# Patient Record
Sex: MALE | Race: OTHER | NOT HISPANIC OR LATINO | Employment: FULL TIME | URBAN - METROPOLITAN AREA
[De-identification: names, ages, dates, MRNs, and addresses within clinical notes are randomized per-mention and may not be internally consistent; named-entity substitution may affect disease eponyms.]

---

## 2021-04-08 DIAGNOSIS — Z23 ENCOUNTER FOR IMMUNIZATION: ICD-10-CM

## 2022-02-13 ENCOUNTER — APPOINTMENT (OUTPATIENT)
Dept: LAB | Facility: HOSPITAL | Age: 67
End: 2022-02-13
Payer: COMMERCIAL

## 2022-02-13 DIAGNOSIS — E78.5 HYPERLIPIDEMIA, UNSPECIFIED HYPERLIPIDEMIA TYPE: ICD-10-CM

## 2022-02-13 DIAGNOSIS — Z12.5 SPECIAL SCREENING FOR MALIGNANT NEOPLASM OF PROSTATE: ICD-10-CM

## 2022-02-13 DIAGNOSIS — I10 ESSENTIAL HYPERTENSION, MALIGNANT: ICD-10-CM

## 2022-02-13 LAB
ALBUMIN SERPL BCP-MCNC: 3.8 G/DL (ref 3.5–5)
ALP SERPL-CCNC: 64 U/L (ref 46–116)
ALT SERPL W P-5'-P-CCNC: 32 U/L (ref 12–78)
ANION GAP SERPL CALCULATED.3IONS-SCNC: 9 MMOL/L (ref 4–13)
AST SERPL W P-5'-P-CCNC: 16 U/L (ref 5–45)
BASOPHILS # BLD AUTO: 0.06 THOUSANDS/ΜL (ref 0–0.1)
BASOPHILS NFR BLD AUTO: 1 % (ref 0–1)
BILIRUB SERPL-MCNC: 0.8 MG/DL (ref 0.2–1)
BUN SERPL-MCNC: 12 MG/DL (ref 5–25)
CALCIUM SERPL-MCNC: 8.6 MG/DL (ref 8.3–10.1)
CHLORIDE SERPL-SCNC: 104 MMOL/L (ref 100–108)
CHOLEST SERPL-MCNC: 203 MG/DL
CO2 SERPL-SCNC: 27 MMOL/L (ref 21–32)
CREAT SERPL-MCNC: 0.97 MG/DL (ref 0.6–1.3)
EOSINOPHIL # BLD AUTO: 0.3 THOUSAND/ΜL (ref 0–0.61)
EOSINOPHIL NFR BLD AUTO: 5 % (ref 0–6)
ERYTHROCYTE [DISTWIDTH] IN BLOOD BY AUTOMATED COUNT: 13.2 % (ref 11.6–15.1)
EST. AVERAGE GLUCOSE BLD GHB EST-MCNC: 137 MG/DL
GFR SERPL CREATININE-BSD FRML MDRD: 81 ML/MIN/1.73SQ M
GLUCOSE P FAST SERPL-MCNC: 123 MG/DL (ref 65–99)
HBA1C MFR BLD: 6.4 %
HCT VFR BLD AUTO: 44.4 % (ref 36.5–49.3)
HDLC SERPL-MCNC: 36 MG/DL
HGB BLD-MCNC: 14.2 G/DL (ref 12–17)
IMM GRANULOCYTES # BLD AUTO: 0.02 THOUSAND/UL (ref 0–0.2)
IMM GRANULOCYTES NFR BLD AUTO: 0 % (ref 0–2)
LDLC SERPL CALC-MCNC: 140 MG/DL (ref 0–100)
LYMPHOCYTES # BLD AUTO: 2.48 THOUSANDS/ΜL (ref 0.6–4.47)
LYMPHOCYTES NFR BLD AUTO: 37 % (ref 14–44)
MCH RBC QN AUTO: 27.1 PG (ref 26.8–34.3)
MCHC RBC AUTO-ENTMCNC: 32 G/DL (ref 31.4–37.4)
MCV RBC AUTO: 85 FL (ref 82–98)
MONOCYTES # BLD AUTO: 0.44 THOUSAND/ΜL (ref 0.17–1.22)
MONOCYTES NFR BLD AUTO: 7 % (ref 4–12)
NEUTROPHILS # BLD AUTO: 3.33 THOUSANDS/ΜL (ref 1.85–7.62)
NEUTS SEG NFR BLD AUTO: 50 % (ref 43–75)
NONHDLC SERPL-MCNC: 167 MG/DL
NRBC BLD AUTO-RTO: 0 /100 WBCS
PLATELET # BLD AUTO: 259 THOUSANDS/UL (ref 149–390)
PMV BLD AUTO: 9.5 FL (ref 8.9–12.7)
POTASSIUM SERPL-SCNC: 4.2 MMOL/L (ref 3.5–5.3)
PROT SERPL-MCNC: 7.7 G/DL (ref 6.4–8.2)
PSA SERPL-MCNC: 2.7 NG/ML (ref 0–4)
RBC # BLD AUTO: 5.24 MILLION/UL (ref 3.88–5.62)
SODIUM SERPL-SCNC: 140 MMOL/L (ref 136–145)
TRIGL SERPL-MCNC: 133 MG/DL
TSH SERPL DL<=0.05 MIU/L-ACNC: 2.76 UIU/ML (ref 0.36–3.74)
URATE SERPL-MCNC: 7 MG/DL (ref 4.2–8)
WBC # BLD AUTO: 6.63 THOUSAND/UL (ref 4.31–10.16)

## 2022-02-13 PROCEDURE — 80061 LIPID PANEL: CPT

## 2022-02-13 PROCEDURE — G0103 PSA SCREENING: HCPCS

## 2022-02-13 PROCEDURE — 83036 HEMOGLOBIN GLYCOSYLATED A1C: CPT

## 2022-02-13 PROCEDURE — 85025 COMPLETE CBC W/AUTO DIFF WBC: CPT

## 2022-02-13 PROCEDURE — 84443 ASSAY THYROID STIM HORMONE: CPT

## 2022-02-13 PROCEDURE — 36415 COLL VENOUS BLD VENIPUNCTURE: CPT

## 2022-02-13 PROCEDURE — 84550 ASSAY OF BLOOD/URIC ACID: CPT

## 2022-02-13 PROCEDURE — 80053 COMPREHEN METABOLIC PANEL: CPT

## 2024-03-26 ENCOUNTER — APPOINTMENT (OUTPATIENT)
Dept: LAB | Facility: CLINIC | Age: 69
End: 2024-03-26
Payer: COMMERCIAL

## 2024-03-26 ENCOUNTER — TRANSCRIBE ORDERS (OUTPATIENT)
Dept: LAB | Facility: CLINIC | Age: 69
End: 2024-03-26

## 2024-03-26 DIAGNOSIS — Z00.00 ROUTINE GENERAL MEDICAL EXAMINATION AT A HEALTH CARE FACILITY: Primary | ICD-10-CM

## 2024-03-26 DIAGNOSIS — R73.09 IMPAIRED GLUCOSE TOLERANCE TEST: ICD-10-CM

## 2024-03-26 DIAGNOSIS — E53.9 VITAMIN B-COMPLEX DEFICIENCY: ICD-10-CM

## 2024-03-26 DIAGNOSIS — E78.9 DISORDER OF LIPOPROTEIN AND LIPID METABOLISM: ICD-10-CM

## 2024-03-26 DIAGNOSIS — Z12.5 SPECIAL SCREENING FOR MALIGNANT NEOPLASM OF PROSTATE: ICD-10-CM

## 2024-03-26 DIAGNOSIS — R94.6 NONSPECIFIC ABNORMAL RESULTS OF THYROID FUNCTION STUDY: ICD-10-CM

## 2024-03-26 DIAGNOSIS — Z13.9 SCREENING FOR UNSPECIFIED CONDITION: ICD-10-CM

## 2024-03-26 DIAGNOSIS — R79.89 HYPOURICEMIA: ICD-10-CM

## 2024-03-26 DIAGNOSIS — E55.9 AVITAMINOSIS D: ICD-10-CM

## 2024-03-26 DIAGNOSIS — R82.90 NONSPECIFIC FINDING ON EXAMINATION OF URINE: ICD-10-CM

## 2024-03-26 DIAGNOSIS — Z00.00 ROUTINE GENERAL MEDICAL EXAMINATION AT A HEALTH CARE FACILITY: ICD-10-CM

## 2024-03-26 LAB
25(OH)D3 SERPL-MCNC: 21.1 NG/ML (ref 30–100)
ALBUMIN SERPL BCP-MCNC: 4.4 G/DL (ref 3.5–5)
ALP SERPL-CCNC: 64 U/L (ref 34–104)
ALT SERPL W P-5'-P-CCNC: 31 U/L (ref 7–52)
ANION GAP SERPL CALCULATED.3IONS-SCNC: 11 MMOL/L (ref 4–13)
AST SERPL W P-5'-P-CCNC: 22 U/L (ref 13–39)
BACTERIA UR QL AUTO: ABNORMAL /HPF
BILIRUB SERPL-MCNC: 0.99 MG/DL (ref 0.2–1)
BILIRUB UR QL STRIP: NEGATIVE
BUN SERPL-MCNC: 12 MG/DL (ref 5–25)
CALCIUM SERPL-MCNC: 9.4 MG/DL (ref 8.4–10.2)
CHLORIDE SERPL-SCNC: 104 MMOL/L (ref 96–108)
CHOLEST SERPL-MCNC: 150 MG/DL
CLARITY UR: CLEAR
CO2 SERPL-SCNC: 25 MMOL/L (ref 21–32)
COLOR UR: YELLOW
CREAT SERPL-MCNC: 0.82 MG/DL (ref 0.6–1.3)
ERYTHROCYTE [DISTWIDTH] IN BLOOD BY AUTOMATED COUNT: 14.2 % (ref 11.6–15.1)
EST. AVERAGE GLUCOSE BLD GHB EST-MCNC: 148 MG/DL
FERRITIN SERPL-MCNC: 40 NG/ML (ref 24–336)
GFR SERPL CREATININE-BSD FRML MDRD: 90 ML/MIN/1.73SQ M
GLUCOSE P FAST SERPL-MCNC: 130 MG/DL (ref 65–99)
GLUCOSE UR STRIP-MCNC: NEGATIVE MG/DL
HBA1C MFR BLD: 6.8 %
HCT VFR BLD AUTO: 47.1 % (ref 36.5–49.3)
HDLC SERPL-MCNC: 42 MG/DL
HGB BLD-MCNC: 14.8 G/DL (ref 12–17)
HGB UR QL STRIP.AUTO: NEGATIVE
HYALINE CASTS #/AREA URNS LPF: ABNORMAL /LPF
KETONES UR STRIP-MCNC: NEGATIVE MG/DL
LDLC SERPL CALC-MCNC: 82 MG/DL (ref 0–100)
LEUKOCYTE ESTERASE UR QL STRIP: NEGATIVE
MCH RBC QN AUTO: 27.4 PG (ref 26.8–34.3)
MCHC RBC AUTO-ENTMCNC: 31.4 G/DL (ref 31.4–37.4)
MCV RBC AUTO: 87 FL (ref 82–98)
MUCOUS THREADS UR QL AUTO: ABNORMAL
NITRITE UR QL STRIP: NEGATIVE
NON-SQ EPI CELLS URNS QL MICRO: ABNORMAL /HPF
NONHDLC SERPL-MCNC: 108 MG/DL
PH UR STRIP.AUTO: 6 [PH]
PLATELET # BLD AUTO: 284 THOUSANDS/UL (ref 149–390)
PMV BLD AUTO: 10.6 FL (ref 8.9–12.7)
POTASSIUM SERPL-SCNC: 4.1 MMOL/L (ref 3.5–5.3)
PROT SERPL-MCNC: 7.3 G/DL (ref 6.4–8.4)
PROT UR STRIP-MCNC: ABNORMAL MG/DL
PSA SERPL-MCNC: 4.14 NG/ML (ref 0–4)
RBC # BLD AUTO: 5.41 MILLION/UL (ref 3.88–5.62)
RBC #/AREA URNS AUTO: ABNORMAL /HPF
SODIUM SERPL-SCNC: 140 MMOL/L (ref 135–147)
SP GR UR STRIP.AUTO: 1.02 (ref 1–1.03)
TRIGL SERPL-MCNC: 130 MG/DL
TSH SERPL DL<=0.05 MIU/L-ACNC: 3.55 UIU/ML (ref 0.45–4.5)
UROBILINOGEN UR STRIP-ACNC: <2 MG/DL
VIT B12 SERPL-MCNC: 322 PG/ML (ref 180–914)
WBC # BLD AUTO: 7.31 THOUSAND/UL (ref 4.31–10.16)
WBC #/AREA URNS AUTO: ABNORMAL /HPF

## 2024-03-26 PROCEDURE — 36415 COLL VENOUS BLD VENIPUNCTURE: CPT

## 2024-03-26 PROCEDURE — 83036 HEMOGLOBIN GLYCOSYLATED A1C: CPT

## 2024-03-26 PROCEDURE — 84443 ASSAY THYROID STIM HORMONE: CPT

## 2024-03-26 PROCEDURE — G0103 PSA SCREENING: HCPCS

## 2024-03-26 PROCEDURE — 80061 LIPID PANEL: CPT

## 2024-03-26 PROCEDURE — 82607 VITAMIN B-12: CPT

## 2024-03-26 PROCEDURE — 85027 COMPLETE CBC AUTOMATED: CPT

## 2024-03-26 PROCEDURE — 82306 VITAMIN D 25 HYDROXY: CPT

## 2024-03-26 PROCEDURE — 80053 COMPREHEN METABOLIC PANEL: CPT

## 2024-03-26 PROCEDURE — 82728 ASSAY OF FERRITIN: CPT

## 2024-03-26 PROCEDURE — 81001 URINALYSIS AUTO W/SCOPE: CPT

## 2024-07-11 ENCOUNTER — OFFICE VISIT (OUTPATIENT)
Dept: UROLOGY | Facility: CLINIC | Age: 69
End: 2024-07-11
Payer: COMMERCIAL

## 2024-07-11 VITALS
DIASTOLIC BLOOD PRESSURE: 80 MMHG | OXYGEN SATURATION: 98 % | SYSTOLIC BLOOD PRESSURE: 140 MMHG | HEIGHT: 71 IN | HEART RATE: 75 BPM | BODY MASS INDEX: 26.6 KG/M2 | WEIGHT: 190 LBS

## 2024-07-11 DIAGNOSIS — R97.20 ELEVATED PSA: Primary | ICD-10-CM

## 2024-07-11 DIAGNOSIS — N40.1 BENIGN PROSTATIC HYPERPLASIA WITH URINARY FREQUENCY: ICD-10-CM

## 2024-07-11 DIAGNOSIS — R35.0 BENIGN PROSTATIC HYPERPLASIA WITH URINARY FREQUENCY: ICD-10-CM

## 2024-07-11 PROCEDURE — 99203 OFFICE O/P NEW LOW 30 MIN: CPT | Performed by: PHYSICIAN ASSISTANT

## 2024-07-11 RX ORDER — ATENOLOL 50 MG/1
50 TABLET ORAL DAILY
COMMUNITY
Start: 2024-04-15

## 2024-07-11 RX ORDER — AMLODIPINE BESYLATE 5 MG/1
5 TABLET ORAL DAILY
COMMUNITY
Start: 2024-04-15

## 2024-07-11 RX ORDER — TAMSULOSIN HYDROCHLORIDE 0.4 MG/1
0.4 CAPSULE ORAL
Qty: 90 CAPSULE | Refills: 1 | Status: SHIPPED | OUTPATIENT
Start: 2024-07-11

## 2024-07-11 RX ORDER — ERGOCALCIFEROL 1.25 MG/1
50000 CAPSULE ORAL WEEKLY
COMMUNITY
Start: 2024-04-08

## 2024-07-11 NOTE — PROGRESS NOTES
7/11/2024      Chief Complaint   Patient presents with   • Urinary Frequency         Assessment and Plan    68 y.o. male managed by NEW PATIENT    1. Elevated PSA  Assessment & Plan:  GOSIA today smooth 50 g prostate flat no nodule or asymmetry  Recheck PSA in 2-3 weeks, avoid ejaculation cycling rowing or anything in the rectum in the 72 hours prior    If there is persistent or progressive PSA rise we will utilize multiparametric MRI for further restratification for prostate cancer, and fusion biopsies in network.  Orders:  -     PSA Total, Diagnostic; Future; Expected date: 08/01/2024  2. Benign prostatic hyperplasia with urinary frequency  Assessment & Plan:  6-12 months of lower urinary tract bother-discussed options, dietary modifications adjusting timing of his antihypertensives to the morning, ensuring adequate hydration while avoiding excessive coffee tea caffeine.  He wishes to trial tamsulosin 0.4 mg nightly over the next 2-3 months with a symptom reassessment at that time  Orders:  -     tamsulosin (FLOMAX) 0.4 mg; Take 1 capsule (0.4 mg total) by mouth daily at bedtime        History of Present Illness  Ted Gale is a 68 y.o. male here for evaluation of new patient complains of urinary frequency daytime and nighttime worse in the daytime sometimes every 30 minutes other x 1-2 hours.  He wakes at least 3 times overnight to urinate.  He reports good flow but not sure that he ever really holds enough urine to get a good stream, no dysuria no hematuria no flank pain and no bladder pain.  He has not ever had infections catheters or surgeries to his urinary tract.  Not tried any dietary/behavioral modifications or prescription therapy for his symptoms.  He takes few low-dose hypertension and diabetes medications.  These are also new within the past year or 2.  Screening PSA just above normal threshold 4.1 earlier this year.  Has not had prior prostate exams.  No personal or family history of   "cancers.    patient's daughter provides additional history along with translation assistance when needed    [facility]rology 2021 for phimosis, lotrisone rx with relief    Review of Systems   Constitutional: Negative.    Respiratory: Negative.     Cardiovascular: Negative.    Genitourinary:  Positive for frequency. Negative for decreased urine volume, difficulty urinating, dysuria, flank pain, hematuria, testicular pain and urgency.   Musculoskeletal: Negative.                 Vitals  Vitals:    07/11/24 0954   BP: 140/80   BP Location: Left arm   Patient Position: Sitting   Cuff Size: Adult   Pulse: 75   SpO2: 98%   Weight: 86.2 kg (190 lb)   Height: 5' 11\" (1.803 m)       Physical Exam  Vitals and nursing note reviewed.   Constitutional:       General: He is not in acute distress.     Appearance: Normal appearance. He is well-developed. He is not diaphoretic.   HENT:      Head: Normocephalic and atraumatic.   Pulmonary:      Effort: Pulmonary effort is normal.      Comments: No cough or audible wheeze  Abdominal:      General: There is no distension.      Tenderness: There is no abdominal tenderness. There is no right CVA tenderness or left CVA tenderness.   Genitourinary:     Comments: uncircumcised penis, normal phallus, orthotopic patent meatus slight discoloration to the glans he has used lotrisone on and off few years for phimosis there is no active balanitis and foreskin easily retracts and reduces.  Testes smooth descended bilaterally into the scrotum nontender with no palpable mass.  Digital rectal exam smooth prostate, without appreciable nodule, induration or asymmetry  Musculoskeletal:      Right lower leg: No edema.      Left lower leg: No edema.   Skin:     General: Skin is warm and dry.   Neurological:      Mental Status: He is alert and oriented to person, place, and time.      Gait: Gait normal.   Psychiatric:         Speech: Speech normal.         Behavior: Behavior normal. " "          Past History  Past Medical History:   Diagnosis Date   • Diabetes mellitus (HCC)      Social History     Socioeconomic History   • Marital status: /Civil Union     Spouse name: None   • Number of children: None   • Years of education: None   • Highest education level: None   Occupational History   • None   Tobacco Use   • Smoking status: Never   • Smokeless tobacco: Never   Substance and Sexual Activity   • Alcohol use: Never   • Drug use: Never   • Sexual activity: None   Other Topics Concern   • None   Social History Narrative   • None     Social Determinants of Health     Financial Resource Strain: Not on file   Food Insecurity: Not on file   Transportation Needs: Not on file   Physical Activity: Not on file   Stress: Not on file   Social Connections: Not on file   Intimate Partner Violence: Not on file   Housing Stability: Not on file     Social History     Tobacco Use   Smoking Status Never   Smokeless Tobacco Never     History reviewed. No pertinent family history.    The following portions of the patient's history were reviewed and updated as appropriate: allergies, current medications, past medical history, past social history, past surgical history and problem list.    Results  No results found for this or any previous visit (from the past 1 hour(s)).]  Lab Results   Component Value Date    PSA 4.14 (H) 03/26/2024    PSA 2.7 02/13/2022     Lab Results   Component Value Date    CALCIUM 9.4 03/26/2024    K 4.1 03/26/2024    CO2 25 03/26/2024     03/26/2024    BUN 12 03/26/2024    CREATININE 0.82 03/26/2024     Lab Results   Component Value Date    WBC 7.31 03/26/2024    HGB 14.8 03/26/2024    HCT 47.1 03/26/2024    MCV 87 03/26/2024     03/26/2024       Portions of the above record have been created with voice recognition software via dictation. Occasional wrong word or \"sound alike\" substitution may have occurred due to the inherent limitations of voice recognition software. " Read the chart carefully and recognize, using context, where substitution may have occurred.

## 2024-07-11 NOTE — ASSESSMENT & PLAN NOTE
6-12 months of lower urinary tract bother-discussed options, dietary modifications adjusting timing of his antihypertensives to the morning, ensuring adequate hydration while avoiding excessive coffee tea caffeine.  He wishes to trial tamsulosin 0.4 mg nightly over the next 2-3 months with a symptom reassessment at that time

## 2024-07-11 NOTE — ASSESSMENT & PLAN NOTE
GOSIA today smooth 50 g prostate flat no nodule or asymmetry  Recheck PSA in 2-3 weeks, avoid ejaculation cycling rowing or anything in the rectum in the 72 hours prior    If there is persistent or progressive PSA rise we will utilize multiparametric MRI for further restratification for prostate cancer, and fusion biopsies in network.

## 2024-08-05 ENCOUNTER — TELEPHONE (OUTPATIENT)
Dept: OTHER | Facility: HOSPITAL | Age: 69
End: 2024-08-05

## 2024-08-05 ENCOUNTER — APPOINTMENT (OUTPATIENT)
Dept: LAB | Facility: CLINIC | Age: 69
End: 2024-08-05
Payer: COMMERCIAL

## 2024-08-05 DIAGNOSIS — R97.20 ELEVATED PSA: ICD-10-CM

## 2024-08-05 DIAGNOSIS — R97.20 ELEVATED PSA: Primary | ICD-10-CM

## 2024-08-05 LAB — PSA SERPL-MCNC: 4.28 NG/ML (ref 0–4)

## 2024-08-05 PROCEDURE — 36415 COLL VENOUS BLD VENIPUNCTURE: CPT

## 2024-08-05 PROCEDURE — 84153 ASSAY OF PSA TOTAL: CPT

## 2024-08-05 NOTE — TELEPHONE ENCOUNTER
[facility] pt seen few weeks ago. discussed elevated psa and future mri. please let pt and his daughter (she translates) know the psa is still elevated i recommend doing the MRI. will watch for results

## 2024-08-06 DIAGNOSIS — R97.20 ELEVATED PSA: Primary | ICD-10-CM

## 2024-08-06 NOTE — TELEPHONE ENCOUNTER
Contacted patient preferred phone number listed in chart and spoke with his daughter in law Shona who is on communication consent. Advised her of patients elevated PSA result and plan for mpMRI prostate. She was provided with central scheduling phone number and will call to schedule imaging for patient. Also aware patient is to have BMP completed prior to MRI. Office to call with results.

## 2024-08-17 ENCOUNTER — APPOINTMENT (OUTPATIENT)
Dept: LAB | Facility: HOSPITAL | Age: 69
End: 2024-08-17
Payer: COMMERCIAL

## 2024-08-17 LAB
ANION GAP SERPL CALCULATED.3IONS-SCNC: 6 MMOL/L (ref 4–13)
BUN SERPL-MCNC: 13 MG/DL (ref 5–25)
CALCIUM SERPL-MCNC: 9.4 MG/DL (ref 8.4–10.2)
CHLORIDE SERPL-SCNC: 105 MMOL/L (ref 96–108)
CO2 SERPL-SCNC: 27 MMOL/L (ref 21–32)
CREAT SERPL-MCNC: 0.77 MG/DL (ref 0.6–1.3)
GFR SERPL CREATININE-BSD FRML MDRD: 93 ML/MIN/1.73SQ M
GLUCOSE SERPL-MCNC: 113 MG/DL (ref 65–140)
POTASSIUM SERPL-SCNC: 4.4 MMOL/L (ref 3.5–5.3)
SODIUM SERPL-SCNC: 138 MMOL/L (ref 135–147)

## 2024-08-17 PROCEDURE — 80048 BASIC METABOLIC PNL TOTAL CA: CPT

## 2024-08-17 PROCEDURE — 36415 COLL VENOUS BLD VENIPUNCTURE: CPT

## 2024-08-19 ENCOUNTER — HOSPITAL ENCOUNTER (OUTPATIENT)
Facility: MEDICAL CENTER | Age: 69
Discharge: HOME | End: 2024-08-19
Payer: COMMERCIAL

## 2024-08-19 DIAGNOSIS — R97.20 ELEVATED PSA: ICD-10-CM

## 2024-08-19 PROCEDURE — 72197 MRI PELVIS W/O & W/DYE: CPT

## 2024-08-19 PROCEDURE — A9585 GADOBUTROL INJECTION: HCPCS | Performed by: PHYSICIAN ASSISTANT

## 2024-08-19 PROCEDURE — 76377 3D RENDER W/INTRP POSTPROCES: CPT

## 2024-08-19 RX ORDER — GADOBUTROL 604.72 MG/ML
8 INJECTION INTRAVENOUS
Status: COMPLETED | OUTPATIENT
Start: 2024-08-19 | End: 2024-08-19

## 2024-08-19 RX ADMIN — GADOBUTROL 8 ML: 604.72 INJECTION INTRAVENOUS at 11:36

## 2024-10-14 NOTE — DISCHARGE INSTRUCTIONS
"Vigorous oral fluid intake and limited activity for the next 24 to 48 hours.  Report any excessive bleeding difficulty urinating or fevers    Patient Education     Prostate biopsy   The Basics   Written by the doctors and editors at [facility]   What is a prostate biopsy? -- A prostate biopsy is a procedure to check the prostate for cancer or other problems. The prostate is a gland that surrounds the urethra (the tube that carries urine from the bladder out through the penis) (figure 1). For a biopsy, a doctor takes a small sample of tissue from the prostate.  You might get a prostate biopsy if:   A blood test shows that your PSA level is high - PSA stands for \"prostate-specific antigen.\" It is a protein made by the prostate. PSA levels usually go up when a person has prostate cancer. But they can also go up for reasons that do not involve cancer. A prostate biopsy can help your doctor figure out the cause of a high PSA.   Your doctor finds a problem during a \"digital rectal exam\" - For a digital rectal exam, your doctor puts a gloved finger into your rectum to feel your prostate. If they feel a lump or thickened area during this exam, they might do a prostate biopsy to help figure out the cause.  How do I prepare for prostate biopsy? -- Before your procedure, your doctor will do an exam and ask you about your \"health history.\" This involves asking you questions about any health problems you have or had in the past, past surgeries or biopsies, and any medicines you take. Tell them about:   Any medicines you are taking - This includes any prescription or \"over-the-counter\" medicines you use, plus any herbal supplements you take. It helps to write down and bring a list of any medicines you take, or bring a bag with all of your medicines with you.   Any allergies you have   Any bleeding problems you have - Certain medicines, including some herbs and supplements, can increase the risk of bleeding. Some health conditions " "also increase this risk. You might need to stop certain medicines for a time before your biopsy.  The doctor or nurse will tell you if you need to do anything special to prepare. In some cases, the doctor might prescribe a medicine to help you relax during the procedure.  Before the procedure, you might need to:   Use an enema or suppository to clean out your rectum.   Get a urine test to check for bacteria.   Take an antibiotic.  You will also get information about:   Eating and drinking before your procedure - In some cases, you might need to \"fast\" before the procedure. This means not eating or drinking anything for a period of time. In other cases, you might be allowed to have liquids until a short time before the procedure. Whether you need to fast, and for how long, depends on the procedure you are having.  Ask the doctor or nurse if you have questions or if there is anything you do not understand.  What happens during prostate biopsy? -- This depends on:   If you have had a prostate biopsy before   If the biopsy is done in a doctor's office or in the hospital   The type of biopsy   How many tissue samples the doctor takes  When it is time for the procedure:   You might get an \"IV,\" which is a thin tube that goes into a vein. This can be used to give you fluids and medicines.   You will get anesthesia medicines. This is to make sure that you do not feel pain during the procedure. Types of anesthesia include:   Local - This type of anesthesia uses medicine to numb a small part of your body so you don't feel pain.   Sedatives - These are medicines to make you relax and feel sleepy.   The doctors and nurses will have you lie on your side with your knees and hips bent for the procedure. They will monitor your breathing, blood pressure, and heart rate during the procedure.   The doctor might use an imaging test such as an ultrasound, MRI, or CT scan to take pictures of your prostate. The pictures will guide the " doctor as they do the biopsy. Sometimes, the imaging test is done before the procedure. Other times, the doctor does the imaging test during the procedure.   The doctor will take a small amount of tissue from the prostate. This can be done in 1 of 2 ways:   Transrectal biopsy - The doctor inserts a small ultrasound probe into the rectum. A needle goes through the probe and removes small pieces of tissue.   Transperineal biopsy - The doctor inserts the needle through the skin between the anus and the scrotum to take the tissue samples.   The procedure takes about 15 minutes.  What happens after prostate biopsy? -- After the procedure, the staff will watch you closely as your anesthesia wears off. Most of the time, you can go home a short time after your biopsy.  As you recover:   It is common to have a small amount of bright red blood from your rectum. You might also have a small amount of blood in your urine or semen.   You will get medicine to help with pain, if needed. You can take non-prescription medicines to relieve pain, such as acetaminophen (sample brand name: Tylenol). The doctor might recommend that you avoid ibuprofen (sample brand names: Advil, Motrin) and naproxen (sample brand name: Aleve) because they can increase your risk of bleeding.   Apply a cold gel pack, bag of ice, or bag of frozen vegetables to your perineum every 1 to 2 hours, for 15 minutes each time. Put a thin towel between the ice (or other cold object) and your skin. Some people find it helpful to use ice for the first 2 days after their biopsy.  You can go back to your normal activities after the procedure, including having sex. Some people are more comfortable if they avoid activities like riding a bike, motorcycle, or horse for about a week.  Your doctor or nurse will tell you when to expect your results.  What are the risks of prostate biopsy? -- Your doctor will talk to you about all of the possible risks, and answer your  questions. Possible risks include:   Infection   Bleeding from your rectum, or blood in your urine or semen   Very bad pain   A tear in the wall of the bladder or rectum   Trouble urinating   Trouble getting an erection  When should I call the doctor? -- Call for advice if:   You have a fever of 100.4°F (38°C) or higher, or chills.   You are not able to urinate, and your bladder feels full.   You have pain or burning when you urinate that lasts for more than a few days.   You have large blood clots (the size of a quarter or bigger) in your urine or start seeing more blood in your urine.   Your urine is thick or cloudy, or has a bad smell.   You have more bleeding from your rectum.   You have very bad pain in your belly that is not helped by pain relievers.  All topics are updated as new evidence becomes available and our peer review process is complete.  This topic retrieved from [employer] on: Mar 14, 2024.  Topic 857320 Version 1.0  Release: 32.2.4 - C32.72  © 2024 UpToDate, Inc. and/or its affiliates. All rights reserved.  figure 1: Prostate gland     This drawing shows male internal organs and a close-up of the prostate gland.  Graphic 41070 Version 5.0  Consumer Information Use and Disclaimer   Disclaimer: This generalized information is a limited summary of diagnosis, treatment, and/or medication information. It is not meant to be comprehensive and should be used as a tool to help the user understand and/or assess potential diagnostic and treatment options. It does NOT include all information about conditions, treatments, medications, side effects, or risks that may apply to a specific patient. It is not intended to be medical advice or a substitute for the medical advice, diagnosis, or treatment of a health care provider based on the health care provider's examination and assessment of a patient's specific and unique circumstances. Patients must speak with a health care provider for complete information about  their health, medical questions, and treatment options, including any risks or benefits regarding use of medications. This information does not endorse any treatments or medications as safe, effective, or approved for treating a specific patient. UpToDate, Inc. and its affiliates disclaim any warranty or liability relating to this information or the use thereof.The use of this information is governed by the Terms of Use, available at https://www.woltersFairShareuwer.com/en/know/clinical-effectiveness-terms. 2024© UpToDate, Inc. and its affiliates and/or licensors. All rights reserved.  Copyright   © 2024 UpToDate, Inc. and/or its affiliates. All rights reserved.

## 2024-10-14 NOTE — H&P
H&P Exam - Urology   Ted Gale 68 y.o. male MRN: [id number]  Unit/Bed#:  Encounter: [id number]    Assessment & Plan     Assessment:  Elevated PSA 4.282  PI-RADS 4 lesion of the prostate on multiparametric MRI    Plan:  MR fusion guided transrectal ultrasound-guided transperineal needle biopsies of the prostate    History of Present Illness   HPI:  Ted Gale is a 68 y.o. male who presents with an elevated PSA of 4.12.  The patient underwent a parametric MRI of the prostate which revealed a 0.9 x 0.6 x 0.9 lesion of the prostate in the left mid lateral peripheral zone, PI-RADS 4.  The patient now presents for MR fusion guided transrectal ultrasound-guided transperineal needle biopsies of the prostate.  I met the patient in the holding area discussed the procedure step-by-step with him performed history and physical answered all patient questions and discussed potential risks and complications of bleeding infection false negative biopsies potential retention potential need for Kendrick catheter or operative intervention.  The patient expressed understanding and provided verbal and signed informed consent.    Review of Systems    Historical Information   Past Medical History:   Diagnosis Date    Diabetes mellitus (HCC)      No past surgical history on file.  Social History   Social History     Substance and Sexual Activity   Alcohol Use Never     Social History     Substance and Sexual Activity   Drug Use Never     Social History     Tobacco Use   Smoking Status Never   Smokeless Tobacco Never     Family History: No family history on file.    Meds/Allergies   all medications and allergies reviewed  No Known Allergies    Objective   Vitals: There were no vitals taken for this visit.    No intake/output data recorded.    Invasive Devices       None                   Physical Exam  Vitals reviewed.   Constitutional:       General: He is not in acute distress.     Appearance: Normal appearance. He is normal weight.  He is not ill-appearing, toxic-appearing or diaphoretic.   HENT:      Head: Normocephalic and atraumatic.      Nose: Nose normal.      Mouth/Throat:      Mouth: Mucous membranes are moist.   Eyes:      Extraocular Movements: Extraocular movements intact.   Cardiovascular:      Rate and Rhythm: Normal rate and regular rhythm.   Pulmonary:      Effort: Pulmonary effort is normal. No respiratory distress.      Breath sounds: Normal breath sounds. No wheezing, rhonchi or rales.   Abdominal:      General: There is no distension.      Palpations: Abdomen is soft.      Tenderness: There is no abdominal tenderness. There is no guarding or rebound.   Musculoskeletal:         General: Normal range of motion.      Cervical back: Neck supple.   Skin:     General: Skin is dry.   Neurological:      Mental Status: He is alert and oriented to person, place, and time.   Psychiatric:         Mood and Affect: Mood normal.         Behavior: Behavior normal.         Thought Content: Thought content normal.         Judgment: Judgment normal.         Lab Results: I have personally reviewed pertinent reports.    Imaging: Results Review Statement: I personally reviewed the following image studies in PACS and associated radiology reports: MRI abdomen/MRCP. My interpretation of the radiology images/reports is: Agree with multiparametric MRI.  EKG, Pathology, and Other Studies: Agree with the parametric MRI  VTE Prophylaxis: Sequential compression device (Venodyne)     Code Status: No Order  Advance Directive and Living Will:      Power of :    POLST:      Counseling / Coordination of Care  Total floor / unit time spent today 30 minutes.  Greater than 50% of total time was spent with the patient and / or family counseling and / or coordination of care.  A description of the counseling / coordination of care:  .

## 2024-10-15 ENCOUNTER — OFFICE VISIT (OUTPATIENT)
Dept: UROLOGY | Facility: CLINIC | Age: 69
End: 2024-10-15
Payer: COMMERCIAL

## 2024-10-15 VITALS
SYSTOLIC BLOOD PRESSURE: 146 MMHG | HEIGHT: 71 IN | OXYGEN SATURATION: 98 % | HEART RATE: 69 BPM | DIASTOLIC BLOOD PRESSURE: 86 MMHG | BODY MASS INDEX: 26.18 KG/M2 | WEIGHT: 187 LBS

## 2024-10-15 DIAGNOSIS — R35.0 BENIGN PROSTATIC HYPERPLASIA WITH URINARY FREQUENCY: ICD-10-CM

## 2024-10-15 DIAGNOSIS — R97.20 ELEVATED PSA: Primary | ICD-10-CM

## 2024-10-15 DIAGNOSIS — N40.1 BENIGN PROSTATIC HYPERPLASIA WITH URINARY FREQUENCY: ICD-10-CM

## 2024-10-15 PROCEDURE — 99213 OFFICE O/P EST LOW 20 MIN: CPT | Performed by: PHYSICIAN ASSISTANT

## 2024-10-28 ENCOUNTER — APPOINTMENT (OUTPATIENT)
Dept: LAB | Facility: HOSPITAL | Age: 69
End: 2024-10-28
Payer: COMMERCIAL

## 2024-10-28 DIAGNOSIS — E11.9 TYPE 2 DIABETES MELLITUS WITHOUT COMPLICATION, WITHOUT LONG-TERM CURRENT USE OF INSULIN (HCC): ICD-10-CM

## 2024-10-28 DIAGNOSIS — Z79.84 DIABETES MELLITUS TREATED WITH ORAL MEDICATION (HCC): ICD-10-CM

## 2024-10-28 DIAGNOSIS — R97.20 ELEVATED PROSTATE SPECIFIC ANTIGEN (PSA): ICD-10-CM

## 2024-10-28 DIAGNOSIS — E11.9 DIABETES MELLITUS TREATED WITH ORAL MEDICATION (HCC): ICD-10-CM

## 2024-10-28 LAB
ALBUMIN SERPL BCG-MCNC: 4.6 G/DL (ref 3.5–5)
ALP SERPL-CCNC: 65 U/L (ref 34–104)
ALT SERPL W P-5'-P-CCNC: 17 U/L (ref 7–52)
ANION GAP SERPL CALCULATED.3IONS-SCNC: 7 MMOL/L (ref 4–13)
AST SERPL W P-5'-P-CCNC: 17 U/L (ref 13–39)
ATRIAL RATE: 62 BPM
BASOPHILS # BLD AUTO: 0.1 THOUSANDS/ΜL (ref 0–0.1)
BASOPHILS NFR BLD AUTO: 2 % (ref 0–1)
BILIRUB SERPL-MCNC: 1.38 MG/DL (ref 0.2–1)
BUN SERPL-MCNC: 12 MG/DL (ref 5–25)
CALCIUM SERPL-MCNC: 9.7 MG/DL (ref 8.4–10.2)
CHLORIDE SERPL-SCNC: 102 MMOL/L (ref 96–108)
CO2 SERPL-SCNC: 29 MMOL/L (ref 21–32)
CREAT SERPL-MCNC: 0.76 MG/DL (ref 0.6–1.3)
EOSINOPHIL # BLD AUTO: 0.2 THOUSAND/ΜL (ref 0–0.61)
EOSINOPHIL NFR BLD AUTO: 3 % (ref 0–6)
ERYTHROCYTE [DISTWIDTH] IN BLOOD BY AUTOMATED COUNT: 13.7 % (ref 11.6–15.1)
EST. AVERAGE GLUCOSE BLD GHB EST-MCNC: 134 MG/DL
GFR SERPL CREATININE-BSD FRML MDRD: 93 ML/MIN/1.73SQ M
GLUCOSE P FAST SERPL-MCNC: 138 MG/DL (ref 65–99)
HBA1C MFR BLD: 6.3 %
HCT VFR BLD AUTO: 46.9 % (ref 36.5–49.3)
HGB BLD-MCNC: 15.1 G/DL (ref 12–17)
IMM GRANULOCYTES # BLD AUTO: 0.03 THOUSAND/UL (ref 0–0.2)
IMM GRANULOCYTES NFR BLD AUTO: 1 % (ref 0–2)
LYMPHOCYTES # BLD AUTO: 2.51 THOUSANDS/ΜL (ref 0.6–4.47)
LYMPHOCYTES NFR BLD AUTO: 38 % (ref 14–44)
MCH RBC QN AUTO: 27.5 PG (ref 26.8–34.3)
MCHC RBC AUTO-ENTMCNC: 32.2 G/DL (ref 31.4–37.4)
MCV RBC AUTO: 85 FL (ref 82–98)
MONOCYTES # BLD AUTO: 0.4 THOUSAND/ΜL (ref 0.17–1.22)
MONOCYTES NFR BLD AUTO: 6 % (ref 4–12)
NEUTROPHILS # BLD AUTO: 3.34 THOUSANDS/ΜL (ref 1.85–7.62)
NEUTS SEG NFR BLD AUTO: 50 % (ref 43–75)
NRBC BLD AUTO-RTO: 0 /100 WBCS
P AXIS: 60 DEGREES
PLATELET # BLD AUTO: 272 THOUSANDS/UL (ref 149–390)
PMV BLD AUTO: 10.1 FL (ref 8.9–12.7)
POTASSIUM SERPL-SCNC: 4.1 MMOL/L (ref 3.5–5.3)
PR INTERVAL: 170 MS
PROT SERPL-MCNC: 7.8 G/DL (ref 6.4–8.4)
QRS AXIS: -22 DEGREES
QRSD INTERVAL: 98 MS
QT INTERVAL: 430 MS
QTC INTERVAL: 436 MS
RBC # BLD AUTO: 5.49 MILLION/UL (ref 3.88–5.62)
SODIUM SERPL-SCNC: 138 MMOL/L (ref 135–147)
T WAVE AXIS: 39 DEGREES
VENTRICULAR RATE: 62 BPM
WBC # BLD AUTO: 6.58 THOUSAND/UL (ref 4.31–10.16)

## 2024-10-28 PROCEDURE — 36415 COLL VENOUS BLD VENIPUNCTURE: CPT

## 2024-10-28 PROCEDURE — 93010 ELECTROCARDIOGRAM REPORT: CPT | Performed by: INTERNAL MEDICINE

## 2024-10-28 PROCEDURE — 87077 CULTURE AEROBIC IDENTIFY: CPT

## 2024-10-28 PROCEDURE — 80053 COMPREHEN METABOLIC PANEL: CPT

## 2024-10-28 PROCEDURE — 83036 HEMOGLOBIN GLYCOSYLATED A1C: CPT

## 2024-10-28 PROCEDURE — 85025 COMPLETE CBC W/AUTO DIFF WBC: CPT

## 2024-10-28 PROCEDURE — 87086 URINE CULTURE/COLONY COUNT: CPT

## 2024-10-30 LAB — BACTERIA UR CULT: ABNORMAL

## 2024-11-06 ENCOUNTER — ANESTHESIA EVENT (OUTPATIENT)
Dept: PERIOP | Facility: HOSPITAL | Age: 69
End: 2024-11-06
Payer: COMMERCIAL

## 2024-11-06 DIAGNOSIS — Z91.89 RISK FACTORS FOR OBSTRUCTIVE SLEEP APNEA: Primary | ICD-10-CM

## 2024-11-06 NOTE — PRE-PROCEDURE INSTRUCTIONS
Pre-Surgery Instructions:   Medication Instructions    amLODIPine (NORVASC) 5 mg tablet Take day of surgery.    atenolol (TENORMIN) 50 mg tablet Take day of surgery.    metFORMIN (GLUCOPHAGE) 500 mg tablet Hold day of surgery.    tamsulosin (FLOMAX) 0.4 mg Take night before surgery    Pt request the instructions be emailed to him today 11/6/24--  Here is your surgeon Dr Blount's office number if you need to call --[phone]    Medication instructions for day surgery reviewed. Please use only a sip of water to take your instructed medications. Avoid all over the counter vitamins, supplements and NSAIDS for one week prior to surgery per anesthesia guidelines. Tylenol is ok to take as needed.     You will receive a call one business day prior to surgery with an arrival time and hospital directions. If your surgery is scheduled on a Monday, the hospital will be calling you on the Friday prior to your surgery. If you have not heard from anyone by 8pm, please call the hospital supervisor through the hospital  at [phone]. ([place] [phone] or [place] [phone]).    Do not eat or drink anything after midnight the night before your surgery, including candy, mints, lifesavers, or chewing gum. Do not drink alcohol 24hrs before your surgery. Try not to smoke at least 24hrs before your surgery.       Follow the pre surgery showering instructions as listed in the “My Surgical Experience Booklet” or otherwise provided by your surgeon's office. Do not use a blade to shave the surgical area 1 week before surgery. It is okay to use a clean electric clippers up to 24 hours before surgery. Do not apply any lotions, creams, including makeup, cologne, deodorant, or perfumes after showering on the day of your surgery. Do not use dry shampoo, hair spray, hair gel, or any type of hair products.     No contact lenses, eye make-up, or artificial eyelashes. Remove nail polish, including gel polish, and any  artificial, gel, or acrylic nails if possible. Remove all jewelry including rings and body piercing jewelry.     Wear causal clothing that is easy to take on and off. Consider your type of surgery.    Keep any valuables, jewelry, piercings at home. Please bring any specially ordered equipment (sling, braces) if indicated.    Arrange for a responsible person to drive you to and from the hospital on the day of your surgery. Please confirm the visitor policy for the day of your procedure when you receive your phone call with an arrival time.     Call the surgeon's office with any new illnesses, exposures, or additional questions prior to surgery.    Please reference your “My Surgical Experience Booklet” for additional information to prepare for your upcoming surgery.

## 2024-11-13 ENCOUNTER — HOSPITAL ENCOUNTER (OUTPATIENT)
Facility: HOSPITAL | Age: 69
Setting detail: OUTPATIENT SURGERY
Discharge: HOME | End: 2024-11-13
Attending: UROLOGY | Admitting: UROLOGY
Payer: COMMERCIAL

## 2024-11-13 ENCOUNTER — ANESTHESIA (OUTPATIENT)
Dept: PERIOP | Facility: HOSPITAL | Age: 69
End: 2024-11-13
Payer: COMMERCIAL

## 2024-11-13 VITALS
WEIGHT: 183.42 LBS | TEMPERATURE: 97.2 F | HEIGHT: 71 IN | HEART RATE: 54 BPM | SYSTOLIC BLOOD PRESSURE: 134 MMHG | DIASTOLIC BLOOD PRESSURE: 72 MMHG | BODY MASS INDEX: 25.68 KG/M2 | RESPIRATION RATE: 16 BRPM | OXYGEN SATURATION: 97 %

## 2024-11-13 DIAGNOSIS — R97.20 ELEVATED PROSTATE SPECIFIC ANTIGEN (PSA): ICD-10-CM

## 2024-11-13 PROBLEM — F41.9 ANXIETY: Status: ACTIVE | Noted: 2024-11-13

## 2024-11-13 PROBLEM — E11.9 DIABETES MELLITUS, TYPE 2 (HCC): Status: ACTIVE | Noted: 2024-11-13

## 2024-11-13 PROBLEM — I10 HTN (HYPERTENSION): Status: ACTIVE | Noted: 2024-11-13

## 2024-11-13 LAB
GLUCOSE SERPL-MCNC: 119 MG/DL (ref 65–140)
GLUCOSE SERPL-MCNC: 133 MG/DL (ref 65–140)

## 2024-11-13 PROCEDURE — 51798 US URINE CAPACITY MEASURE: CPT | Performed by: UROLOGY

## 2024-11-13 PROCEDURE — 55700 PR PROSTATE NEEDLE BIOPSY ANY APPROACH: CPT | Performed by: UROLOGY

## 2024-11-13 PROCEDURE — NC001 PR NO CHARGE: Performed by: UROLOGY

## 2024-11-13 PROCEDURE — 76942 ECHO GUIDE FOR BIOPSY: CPT | Performed by: UROLOGY

## 2024-11-13 PROCEDURE — 88344 IMHCHEM/IMCYTCHM EA MLT ANTB: CPT | Performed by: PATHOLOGY

## 2024-11-13 PROCEDURE — 82948 REAGENT STRIP/BLOOD GLUCOSE: CPT

## 2024-11-13 PROCEDURE — G0416 PROSTATE BIOPSY, ANY MTHD: HCPCS | Performed by: PATHOLOGY

## 2024-11-13 RX ORDER — LIDOCAINE HYDROCHLORIDE 20 MG/ML
INJECTION, SOLUTION EPIDURAL; INFILTRATION; INTRACAUDAL; PERINEURAL AS NEEDED
Status: DISCONTINUED | OUTPATIENT
Start: 2024-11-13 | End: 2024-11-13

## 2024-11-13 RX ORDER — SODIUM CHLORIDE 9 MG/ML
125 INJECTION, SOLUTION INTRAVENOUS CONTINUOUS
Status: DISCONTINUED | OUTPATIENT
Start: 2024-11-13 | End: 2024-11-13 | Stop reason: HOSPADM

## 2024-11-13 RX ORDER — PHENYLEPHRINE HCL IN 0.9% NACL 1 MG/10 ML
SYRINGE (ML) INTRAVENOUS AS NEEDED
Status: DISCONTINUED | OUTPATIENT
Start: 2024-11-13 | End: 2024-11-13

## 2024-11-13 RX ORDER — PROPOFOL 10 MG/ML
INJECTION, EMULSION INTRAVENOUS CONTINUOUS PRN
Status: DISCONTINUED | OUTPATIENT
Start: 2024-11-13 | End: 2024-11-13

## 2024-11-13 RX ORDER — EPHEDRINE SULFATE 50 MG/ML
INJECTION INTRAVENOUS AS NEEDED
Status: DISCONTINUED | OUTPATIENT
Start: 2024-11-13 | End: 2024-11-13

## 2024-11-13 RX ORDER — MIDAZOLAM HYDROCHLORIDE 2 MG/2ML
INJECTION, SOLUTION INTRAMUSCULAR; INTRAVENOUS AS NEEDED
Status: DISCONTINUED | OUTPATIENT
Start: 2024-11-13 | End: 2024-11-13

## 2024-11-13 RX ORDER — CEFAZOLIN SODIUM 2 G/50ML
2000 SOLUTION INTRAVENOUS ONCE
Status: COMPLETED | OUTPATIENT
Start: 2024-11-13 | End: 2024-11-13

## 2024-11-13 RX ORDER — LIDOCAINE HYDROCHLORIDE AND EPINEPHRINE BITARTRATE 20; .01 MG/ML; MG/ML
INJECTION, SOLUTION SUBCUTANEOUS AS NEEDED
Status: DISCONTINUED | OUTPATIENT
Start: 2024-11-13 | End: 2024-11-13 | Stop reason: HOSPADM

## 2024-11-13 RX ORDER — PROPOFOL 10 MG/ML
INJECTION, EMULSION INTRAVENOUS AS NEEDED
Status: DISCONTINUED | OUTPATIENT
Start: 2024-11-13 | End: 2024-11-13

## 2024-11-13 RX ORDER — FENTANYL CITRATE 50 UG/ML
INJECTION, SOLUTION INTRAMUSCULAR; INTRAVENOUS AS NEEDED
Status: DISCONTINUED | OUTPATIENT
Start: 2024-11-13 | End: 2024-11-13

## 2024-11-13 RX ORDER — HYDROCODONE BITARTRATE AND ACETAMINOPHEN 5; 325 MG/1; MG/1
1 TABLET ORAL EVERY 6 HOURS PRN
Status: DISCONTINUED | OUTPATIENT
Start: 2024-11-13 | End: 2024-11-13 | Stop reason: HOSPADM

## 2024-11-13 RX ORDER — ONDANSETRON 2 MG/ML
INJECTION INTRAMUSCULAR; INTRAVENOUS AS NEEDED
Status: DISCONTINUED | OUTPATIENT
Start: 2024-11-13 | End: 2024-11-13

## 2024-11-13 RX ORDER — ONDANSETRON 2 MG/ML
4 INJECTION INTRAMUSCULAR; INTRAVENOUS ONCE AS NEEDED
Status: DISCONTINUED | OUTPATIENT
Start: 2024-11-13 | End: 2024-11-13 | Stop reason: HOSPADM

## 2024-11-13 RX ORDER — FENTANYL CITRATE/PF 50 MCG/ML
25 SYRINGE (ML) INJECTION
Status: DISCONTINUED | OUTPATIENT
Start: 2024-11-13 | End: 2024-11-13 | Stop reason: HOSPADM

## 2024-11-13 RX ADMIN — FENTANYL CITRATE 50 MCG: 50 INJECTION INTRAMUSCULAR; INTRAVENOUS at 10:33

## 2024-11-13 RX ADMIN — MIDAZOLAM 1 MG: 1 INJECTION INTRAMUSCULAR; INTRAVENOUS at 10:27

## 2024-11-13 RX ADMIN — Medication 100 MCG: at 10:51

## 2024-11-13 RX ADMIN — SODIUM CHLORIDE 4 MCG: 9 INJECTION, SOLUTION INTRAVENOUS at 10:43

## 2024-11-13 RX ADMIN — SODIUM CHLORIDE 125 ML/HR: 0.9 INJECTION, SOLUTION INTRAVENOUS at 09:14

## 2024-11-13 RX ADMIN — CEFAZOLIN SODIUM 2000 MG: 2 SOLUTION INTRAVENOUS at 10:37

## 2024-11-13 RX ADMIN — Medication 100 MCG: at 10:48

## 2024-11-13 RX ADMIN — PROPOFOL 20 MG: 10 INJECTION, EMULSION INTRAVENOUS at 10:35

## 2024-11-13 RX ADMIN — EPHEDRINE SULFATE 5 MG: 50 INJECTION INTRAVENOUS at 10:58

## 2024-11-13 RX ADMIN — LIDOCAINE HYDROCHLORIDE 50 MG: 20 INJECTION, SOLUTION EPIDURAL; INFILTRATION; INTRACAUDAL at 10:35

## 2024-11-13 RX ADMIN — Medication 100 MCG: at 10:45

## 2024-11-13 RX ADMIN — SODIUM CHLORIDE 8 MCG: 9 INJECTION, SOLUTION INTRAVENOUS at 10:27

## 2024-11-13 RX ADMIN — PROPOFOL 100 MCG/KG/MIN: 10 INJECTION, EMULSION INTRAVENOUS at 10:35

## 2024-11-13 RX ADMIN — EPHEDRINE SULFATE 10 MG: 50 INJECTION INTRAVENOUS at 11:02

## 2024-11-13 RX ADMIN — Medication 100 MCG: at 10:59

## 2024-11-13 RX ADMIN — Medication 100 MCG: at 11:02

## 2024-11-13 RX ADMIN — ONDANSETRON 4 MG: 2 INJECTION INTRAMUSCULAR; INTRAVENOUS at 10:46

## 2024-11-13 NOTE — ANESTHESIA POSTPROCEDURE EVALUATION
Post-Op Assessment Note    CV Status:  Stable  Pain Score: 0    Pain management: adequate       Mental Status:  Sleepy   Hydration Status:  Euvolemic   PONV Controlled:  Controlled   Airway Patency:  Patent     Post Op Vitals Reviewed: Yes    No anethesia notable event occurred.    Staff: CRNA       Last Filed PACU Vitals:  Vitals Value Taken Time   Temp 97.2 °F (36.2 °C) 11/13/24 1108   Pulse 62 11/13/24 1111   /60 11/13/24 1108   Resp 16 11/13/24 1108   SpO2 100 % 11/13/24 1111   Vitals shown include unfiled device data.    Modified Naif:  No data recorded

## 2024-11-13 NOTE — INTERVAL H&P NOTE
H&P reviewed. After examining the patient I find no changes in the patients condition since the H&P had been written.    Vitals:    11/13/24 0919   BP: 159/82   Pulse: 64   Resp: 20   Temp: 97.5 °F (36.4 °C)   SpO2: 98%

## 2024-11-13 NOTE — ANESTHESIA PREPROCEDURE EVALUATION
Procedure:  TRANSPERINEAL MRI FUSION BIOPSY PROSTATE (Perineum)    Relevant Problems   CARDIO   (+) HTN (hypertension)      ENDO   (+) Diabetes mellitus, type 2 (HCC)      NEURO/PSYCH   (+) Anxiety        Physical Exam    Airway    Mallampati score: II         Dental    lower dentures and upper dentures    Cardiovascular  Cardiovascular exam normal    Pulmonary  Pulmonary exam normal     Other Findings        Anesthesia Plan  ASA Score- 2     Anesthesia Type- IV sedation with anesthesia with ASA Monitors.         Additional Monitors:     Airway Plan:            Plan Factors-Exercise tolerance (METS): >4 METS.    Chart reviewed.        Patient is not a current smoker.      Obstructive sleep apnea risk education given perioperatively.        Induction- intravenous.    Postoperative Plan-         Informed Consent- Anesthetic plan and risks discussed with patient.

## 2024-11-13 NOTE — ANESTHESIA POSTPROCEDURE EVALUATION
Post-Op Assessment Note    CV Status:  Stable    Pain management: adequate       Mental Status:  Alert and awake   Hydration Status:  Euvolemic   PONV Controlled:  Controlled   Airway Patency:  Patent     Post Op Vitals Reviewed: Yes    No anethesia notable event occurred.    Staff: Anesthesiologist           Last Filed PACU Vitals:  Vitals Value Taken Time   Temp 97.2 °F (36.2 °C) 11/13/24 1108   Pulse 62 11/13/24 1132   /66 11/13/24 1123   Resp 19 11/13/24 1132   SpO2 97 % 11/13/24 1132   Vitals shown include unfiled device data.    Modified Naif:  Activity: 2 (11/13/2024 11:15 AM)  Respiration: 2 (11/13/2024 11:15 AM)  Circulation: 1 (11/13/2024 11:15 AM)  Consciousness: 1 (11/13/2024 11:15 AM)  Oxygen Saturation: 1 (11/13/2024 11:15 AM)  Modified Naif Score: 7 (11/13/2024 11:15 AM)

## 2024-11-13 NOTE — PERIOPERATIVE NURSING NOTE
Alert, awake. Communication via phone interpretation from daughter in law. Pt denies pain.  VSS.  No S/S of distress.

## 2024-11-13 NOTE — OP NOTE
OPERATIVE REPORT  PATIENT NAME: Ted Gale    :  1955  MRN: [id number]  Pt Location: [facility] OR ROOM 01    SURGERY DATE: 2024    Surgeons and Role:     * Frankie Blount MD - Primary     * Angelia Garcia PA-C - Observing    Preop Diagnosis:  Elevated prostate specific antigen (PSA) [R97.20]  PI-RADS 4 lesion of the prostate on multiparametric MRI  Post-Op Diagnosis Codes:     * Elevated prostate specific antigen (PSA) [R97.20]  PI-RADS 4 lesion of the prostate on multiparametric MRI  Procedure(s):  MR fusion guided transrectal ultrasound-guided transperineal needle biopsies of the prostate    Specimen(s):  ID Type Source Tests Collected by Time Destination   1 : RIGHT ANTERIOR MEDIAL Tissue Prostate TISSUE EXAM Frankie Blount MD 2024 1019    2 : Right Anterior lateral Tissue Prostate TISSUE EXAM Frankie Blount MD 2024 1019    3 : Right posterior lateral Tissue Prostate TISSUE EXAM Frankie Blount MD 2024 1019    4 : Left Anterior Medial Tissue Prostate TISSUE EXAM Frankie Blount MD 2024 1019    5 : left anterior lateral Tissue Prostate TISSUE EXAM Frankie Blount MD 2024 1019    6 : Left posterior lateral Tissue Prostate TISSUE EXAM Frankie Blount MD 2024 1019    7 : left posterior medial Tissue Prostate TISSUE EXAM Frankie Blount MD 2024 1019    8 : Left base Tissue Prostate TISSUE EXAM Frankie Blount MD 2024 1019    9 : right posterior medial Tissue Prostate TISSUE EXAM Frankie Blount MD 2024 1019    10 : right base Tissue Prostate TISSUE EXAM Frankie Blount MD 2024 1019    11 : left transitional zone Tissue Prostate TISSUE EXAM Frankie Blount MD 2024 1022    12 : region of interest Tissue Prostate TISSUE EXAM Frankie Blount MD 2024 1022    13 : right transitional zone Tissue Prostate TISSUE EXAM Frankie Blount MD 2024 1022        Estimated Blood Loss:   1  mL    Drains:  * No LDAs found *    Anesthesia Type:   IV Sedation with Anesthesia    Operative Indications:  Elevated prostate specific antigen (PSA) [R97.20]  PI-RADS 4 lesion of the prostate on multiparametric MRI    Operative Findings:  See operative note below      Complications:   None    Procedure and Technique:  I saw the patient in the holding area reviewed the procedure with him performed history and physical discussed risks and complications and answered questions.  The patient was taken to the operating identified by the surgeon prepped draped usual sterile fashion in dorsolithotomy position.  Using a condom covered lubricated transrectal ultrasound probe this was inserted per anus into the rectal vault with sagittal and transverse imaging the prostate and seminal vesicles being performed.  Seminal vesicles appeared normal.  The prostate was enlarged with periurethral calcifications and heterogeneous tissue on both the transitional zones and peripheral zones.  No hypoechoic peripheral zone lesions however were identified.  Lidocaine 2% with epinephrine was used to anesthetize the skin of the perineum immediate subcutaneous tissue and then under ultrasound guidance periapical subcutaneous tissue was well.  A transverse ultrasound sweep was provided for MR fusion matching.  Once this was accomplished precision point was used to take 5 cores using the spring-loaded needle biopsy gun of the regions of interest and then another 24 cores of 12 regions of the prostate in a systematic fashion for a total or grand total of 29 cores obtained.  After completion of the biopsies pressure was held against the prostate for 3 minutes using the ultrasound probe.  All equipment was then removed and the patient was transferred to the PACU in good condition.  There were no complications.  The patient was discharged in good condition.  He will follow-up with urologic provider in 1 week to discuss biopsy results.   I was  present for the entire procedure. and I was present for all critical portions of the procedure.    Patient Disposition:  PACU              SIGNATURE: Frankie Blount MD  DATE: November 13, 2024  TIME: 11:02 AM

## 2024-11-15 PROCEDURE — 88344 IMHCHEM/IMCYTCHM EA MLT ANTB: CPT | Performed by: PATHOLOGY

## 2024-11-15 PROCEDURE — G0416 PROSTATE BIOPSY, ANY MTHD: HCPCS | Performed by: PATHOLOGY

## 2024-11-22 ENCOUNTER — OFFICE VISIT (OUTPATIENT)
Dept: UROLOGY | Facility: MEDICAL CENTER | Age: 69
End: 2024-11-22
Payer: COMMERCIAL

## 2024-11-22 VITALS
HEART RATE: 66 BPM | HEIGHT: 71 IN | SYSTOLIC BLOOD PRESSURE: 110 MMHG | OXYGEN SATURATION: 98 % | BODY MASS INDEX: 25.58 KG/M2 | DIASTOLIC BLOOD PRESSURE: 70 MMHG

## 2024-11-22 DIAGNOSIS — N40.1 BENIGN PROSTATIC HYPERPLASIA WITH URINARY FREQUENCY: ICD-10-CM

## 2024-11-22 DIAGNOSIS — C61 PROSTATE CANCER (HCC): ICD-10-CM

## 2024-11-22 DIAGNOSIS — R97.20 ELEVATED PSA: Primary | ICD-10-CM

## 2024-11-22 DIAGNOSIS — R35.0 BENIGN PROSTATIC HYPERPLASIA WITH URINARY FREQUENCY: ICD-10-CM

## 2024-11-22 PROCEDURE — 99214 OFFICE O/P EST MOD 30 MIN: CPT | Performed by: UROLOGY

## 2024-11-22 NOTE — PROGRESS NOTES
Name: Ted Gale      : 1955      MRN: [id number]  Encounter Provider: Frankie Blount MD  Encounter Date: 2024   [facility]  :  Assessment & Plan  Elevated PSA  4.1       Benign prostatic hyperplasia with urinary frequency  On tamsulosin 0.4 mg p.o. daily.  Patient is unsure whether this has helped him and he will take a tamsulosin holiday to see if he has any worsening of his urination.  Eventual cystoscopy and urinary evaluation after oncologic evaluation complete.       Prostate cancer (HCC)  Johnny pattern score 7 equals 3+4 disease on the left side of the prostate.  Plan Prolaris genomic testing and consultation with both urologic robotic surgery and radiation therapy to discuss therapeutic intervention if genomic testing suggest that that would be the best course  Orders:    MISCELLANEOUS LAB TEST    NM PET CT skull base to mid thigh; Future    Ambulatory Referral to Urology; Future    Ambulatory Referral to Radiation Oncology; Future        History of Present Illness   Ted Gale is a 69 y.o. male who presentsTed Gale is a 68 y.o. male who presents with an elevated PSA of 4.12.  The patient underwent a parametric MRI of the prostate which revealed a 0.9 x 0.6 x 0.9 lesion of the prostate in the left mid lateral peripheral zone, PI-RADS 4.   The patient returns today with his daughter-in-law who acted as  to discuss biopsy findings.  [place] pattern score 3+4 equal 7 was identified and the patient will discuss either active surveillance robotic surgery or intervention with radiation therapy.  Genomic testing will be suggested as well    Review of Systems   Genitourinary:  Positive for frequency and urgency.   All other systems reviewed and are negative.    Pertinent Medical History           Medical History Reviewed by provider this encounter:  Tobacco  Allergies  Meds  Problems  Med Hx  Surg Hx  Fam Hx   Soc   Hx    .  Past Medical History   Past Medical History:   Diagnosis Date    Diabetes mellitus (HCC)     Elevated PSA     Full dentures     Hyperlipidemia     Hypertension     Language barrier     PANJABI    Wears glasses      Past Surgical History:   Procedure Laterality Date    NO PAST SURGERIES      HI PROSTATE NEEDLE BIOPSY ANY APPROACH N/A 11/13/2024    Procedure: TRANSPERINEAL MRI FUSION BIOPSY PROSTATE;  Surgeon: Frankie Blount MD;  Location[facility];  Service: Urology     Family History   Problem Relation Age of Onset    Diabetes Mother       reports that he has quit smoking. His smoking use included cigarettes. He has never used smokeless tobacco. He reports that he does not currently use alcohol. He reports that he does not use drugs.  Current Outpatient Medications on File Prior to Visit   Medication Sig Dispense Refill    amLODIPine (NORVASC) 5 mg tablet Take 5 mg by mouth daily      atenolol (TENORMIN) 50 mg tablet Take 50 mg by mouth daily      metFORMIN (GLUCOPHAGE) 500 mg tablet Take 500 mg by mouth 2 (two) times a day with meals With morning and evening      tamsulosin (FLOMAX) 0.4 mg Take 1 capsule (0.4 mg total) by mouth daily at bedtime 90 capsule 1    ergocalciferol (VITAMIN D2) 50,000 units Take 50,000 Units by mouth once a week       No current facility-administered medications on file prior to visit.   No Known Allergies   Current Outpatient Medications on File Prior to Visit   Medication Sig Dispense Refill    amLODIPine (NORVASC) 5 mg tablet Take 5 mg by mouth daily      atenolol (TENORMIN) 50 mg tablet Take 50 mg by mouth daily      metFORMIN (GLUCOPHAGE) 500 mg tablet Take 500 mg by mouth 2 (two) times a day with meals With morning and evening      tamsulosin (FLOMAX) 0.4 mg Take 1 capsule (0.4 mg total) by mouth daily at bedtime 90 capsule 1    ergocalciferol (VITAMIN D2) 50,000 units Take 50,000 Units by mouth once a week       No current facility-administered medications on  "file prior to visit.      Social History     Tobacco Use    Smoking status: Former     Types: Cigarettes    Smokeless tobacco: Never    Tobacco comments:     Quit before 1984   Vaping Use    Vaping status: Never Used   Substance and Sexual Activity    Alcohol use: Not Currently    Drug use: Never    Sexual activity: Not on file     Comment: defer        Objective   /70 (BP Location: Left arm, Patient Position: Sitting, Cuff Size: Standard)   Pulse 66   Ht 5' 11\" (1.803 m)   SpO2 98%   BMI 25.58 kg/m²     Physical Exam  Vitals reviewed.   Constitutional:       General: He is not in acute distress.     Appearance: Normal appearance. He is normal weight. He is not ill-appearing, toxic-appearing or diaphoretic.   HENT:      Head: Normocephalic.      Nose: Nose normal.      Mouth/Throat:      Mouth: Mucous membranes are moist.   Pulmonary:      Effort: Pulmonary effort is normal. No respiratory distress.   Skin:     General: Skin is dry.   Neurological:      Mental Status: He is alert and oriented to person, place, and time.   Psychiatric:         Mood and Affect: Mood normal.         Thought Content: Thought content normal.         Judgment: Judgment normal.           Results  Lab Results   Component Value Date    PSA 4.282 (H) 08/05/2024    PSA 4.14 (H) 03/26/2024    PSA 2.7 02/13/2022     Lab Results   Component Value Date    CALCIUM 9.7 10/28/2024    K 4.1 10/28/2024    CO2 29 10/28/2024     10/28/2024    BUN 12 10/28/2024    CREATININE 0.76 10/28/2024     Lab Results   Component Value Date    WBC 6.58 10/28/2024    HGB 15.1 10/28/2024    HCT 46.9 10/28/2024    MCV 85 10/28/2024     10/28/2024       Office Urine Dip  No results found for this or any previous visit (from the past hour).]      "

## 2024-11-22 NOTE — LETTER
2024     Bubba Hernandez MD  [address] [address]    Patient: Ted Gale   YOB: 1955   Date of Visit: 2024       Dear Dr. Hernandez:    Thank you for referring Ted Gale to me for evaluation. Below are my notes for this consultation.    If you have questions, please do not hesitate to call me. I look forward to following your patient along with you.         Sincerely,        Frankie Blount MD        CC: No Recipients    Frankie Blount MD  2024  5:03 PM  Sign when Signing Visit  Name: Ted Gale      : 1955      MRN: [id number]  Encounter Provider: Frankie Blount MD  Encounter Date: 2024   [facility]  :  Assessment & Plan  Elevated PSA  4.1       Benign prostatic hyperplasia with urinary frequency  On tamsulosin 0.4 mg p.o. daily.  Patient is unsure whether this has helped him and he will take a tamsulosin holiday to see if he has any worsening of his urination.  Eventual cystoscopy and urinary evaluation after oncologic evaluation complete.       Prostate cancer (HCC)  Johnny pattern score 7 equals 3+4 disease on the left side of the prostate.  Plan Prolaris genomic testing and consultation with both urologic robotic surgery and radiation therapy to discuss therapeutic intervention if genomic testing suggest that that would be the best course  Orders:  •  MISCELLANEOUS LAB TEST  •  NM PET CT skull base to mid thigh; Future  •  Ambulatory Referral to Urology; Future  •  Ambulatory Referral to Radiation Oncology; Future        History of Present Illness  Ted Gale is a 69 y.o. male who presentsTed Gale is a 68 y.o. male who presents with an elevated PSA of 4.12.  The patient underwent a parametric MRI of the prostate which revealed a 0.9 x 0.6 x 0.9 lesion of the prostate in the left mid lateral peripheral zone, PI-RADS 4.   The patient returns today  with his daughter-in-law who acted as  to discuss biopsy findings.  [place] pattern score 3+4 equal 7 was identified and the patient will discuss either active surveillance robotic surgery or intervention with radiation therapy.  Genomic testing will be suggested as well    Review of Systems   Genitourinary:  Positive for frequency and urgency.   All other systems reviewed and are negative.    Pertinent Medical History          Medical History Reviewed by provider this encounter:  Tobacco  Allergies  Meds  Problems  Med Hx  Surg Hx  Fam Hx  Soc   Hx    .  Past Medical History  Past Medical History:   Diagnosis Date   • Diabetes mellitus (HCC)    • Elevated PSA    • Full dentures    • Hyperlipidemia    • Hypertension    • Language barrier     PANJABI   • Wears glasses      Past Surgical History:   Procedure Laterality Date   • NO PAST SURGERIES     • SD PROSTATE NEEDLE BIOPSY ANY APPROACH N/A 11/13/2024    Procedure: TRANSPERINEAL MRI FUSION BIOPSY PROSTATE;  Surgeon: Frankie Blount MD;  Location: [facility];  Service: Urology     Family History   Problem Relation Age of Onset   • Diabetes Mother       reports that he has quit smoking. His smoking use included cigarettes. He has never used smokeless tobacco. He reports that he does not currently use alcohol. He reports that he does not use drugs.  Current Outpatient Medications on File Prior to Visit   Medication Sig Dispense Refill   • amLODIPine (NORVASC) 5 mg tablet Take 5 mg by mouth daily     • atenolol (TENORMIN) 50 mg tablet Take 50 mg by mouth daily     • metFORMIN (GLUCOPHAGE) 500 mg tablet Take 500 mg by mouth 2 (two) times a day with meals With morning and evening     • tamsulosin (FLOMAX) 0.4 mg Take 1 capsule (0.4 mg total) by mouth daily at bedtime 90 capsule 1   • ergocalciferol (VITAMIN D2) 50,000 units Take 50,000 Units by mouth once a week       No current facility-administered medications on file prior to visit.   No Known  "Allergies   Current Outpatient Medications on File Prior to Visit   Medication Sig Dispense Refill   • amLODIPine (NORVASC) 5 mg tablet Take 5 mg by mouth daily     • atenolol (TENORMIN) 50 mg tablet Take 50 mg by mouth daily     • metFORMIN (GLUCOPHAGE) 500 mg tablet Take 500 mg by mouth 2 (two) times a day with meals With morning and evening     • tamsulosin (FLOMAX) 0.4 mg Take 1 capsule (0.4 mg total) by mouth daily at bedtime 90 capsule 1   • ergocalciferol (VITAMIN D2) 50,000 units Take 50,000 Units by mouth once a week       No current facility-administered medications on file prior to visit.      Social History     Tobacco Use   • Smoking status: Former     Types: Cigarettes   • Smokeless tobacco: Never   • Tobacco comments:     Quit before 1984   Vaping Use   • Vaping status: Never Used   Substance and Sexual Activity   • Alcohol use: Not Currently   • Drug use: Never   • Sexual activity: Not on file     Comment: defer        Objective  /70 (BP Location: Left arm, Patient Position: Sitting, Cuff Size: Standard)   Pulse 66   Ht 5' 11\" (1.803 m)   SpO2 98%   BMI 25.58 kg/m²     Physical Exam  Vitals reviewed.   Constitutional:       General: He is not in acute distress.     Appearance: Normal appearance. He is normal weight. He is not ill-appearing, toxic-appearing or diaphoretic.   HENT:      Head: Normocephalic.      Nose: Nose normal.      Mouth/Throat:      Mouth: Mucous membranes are moist.   Pulmonary:      Effort: Pulmonary effort is normal. No respiratory distress.   Skin:     General: Skin is dry.   Neurological:      Mental Status: He is alert and oriented to person, place, and time.   Psychiatric:         Mood and Affect: Mood normal.         Thought Content: Thought content normal.         Judgment: Judgment normal.           Results  Lab Results   Component Value Date    PSA 4.282 (H) 08/05/2024    PSA 4.14 (H) 03/26/2024    PSA 2.7 02/13/2022     Lab Results   Component Value Date    " CALCIUM 9.7 10/28/2024    K 4.1 10/28/2024    CO2 29 10/28/2024     10/28/2024    BUN 12 10/28/2024    CREATININE 0.76 10/28/2024     Lab Results   Component Value Date    WBC 6.58 10/28/2024    HGB 15.1 10/28/2024    HCT 46.9 10/28/2024    MCV 85 10/28/2024     10/28/2024       Office Urine Dip  No results found for this or any previous visit (from the past hour).]

## 2024-11-22 NOTE — ASSESSMENT & PLAN NOTE
On tamsulosin 0.4 mg p.o. daily.  Patient is unsure whether this has helped him and he will take a tamsulosin holiday to see if he has any worsening of his urination.  Eventual cystoscopy and urinary evaluation after oncologic evaluation complete.

## 2024-11-25 ENCOUNTER — DOCUMENTATION (OUTPATIENT)
Dept: HEMATOLOGY ONCOLOGY | Facility: CLINIC | Age: 69
End: 2024-11-25

## 2024-11-25 ENCOUNTER — TELEPHONE (OUTPATIENT)
Dept: UROLOGY | Facility: MEDICAL CENTER | Age: 69
End: 2024-11-25

## 2024-11-25 ENCOUNTER — PATIENT OUTREACH (OUTPATIENT)
Dept: HEMATOLOGY ONCOLOGY | Facility: CLINIC | Age: 69
End: 2024-11-25

## 2024-11-25 NOTE — TELEPHONE ENCOUNTER
LARRYI:    Called to schedule pt for PSMA PET CT scan.  The earliest available appt was 12/10/24 at [place].  I explained that the patient was going away 12/9 and needed an appt earlier.      A message was placed in the patient's chart by Central Scheduling and sent to someone else in the department stating that the patient would need an earlier appt.  They are going to keep an eye on the schedules in case someone cancels that they can move him up.  Otherwise, they do have the date of 1/24/25 when the patient comes back.    He is scheduled for Rad Onc appt 12/3/24 and also with Dr. Wolf for 1/29/25.      The patient is going to receive a call if they have an earlier appt for him.    I will keep checking his appt desk to see what happens with his PSMA appt.      I will schedule follow up with you once the other appts are definite.  The patient is aware of this.

## 2024-11-25 NOTE — PROGRESS NOTES
All records needed are in patients chart. No records retrieval needed at this time.     Referral received/ Chart reviewed for work up completed for radiation oncology consult    Imaging completed: [x][facility] []Other:    [] PET/CT   [x] MRI   [] CT   [] US   [] Mammo   [] Bone scan   [] N/A    Pathology completed: [x][facility] []Other:    Date:   Location:   []N/A    Additional records needed:   [] Genomic report   [] Genetic testing results   [] Office Note   [] Procedure/ Operative note   [] Lab results   [x] N/A      [] Radiation Oncology records retrieval needed (PN to route to rad/onc clerical pool once scheduled)  Date:  Location:      Urologist:     [facility]         PSA Date:        Lab Results   Component Value Date    PSA 4.282 (H) 08/05/2024    PSA 4.14 (H) 03/26/2024    PSA 2.7 02/13/2022

## 2024-11-25 NOTE — TELEPHONE ENCOUNTER
----- Message from Frankie Blount MD sent at 11/22/2024  5:03 PM EST -----  Ordered Prolaris test.  Patient is going to [facility] from December 9 until the middle of January.  Please arrange for PET scan to be performed prior to him leaving in December.  He can see consultants after he returns and then return to me shortly thereafter for further discussion and decision for either radiation or surgery if appropriate

## 2024-11-26 ENCOUNTER — HOSPITAL ENCOUNTER (OUTPATIENT)
Dept: NUCLEAR MEDICINE | Facility: HOSPITAL | Age: 69
Discharge: HOME | End: 2024-11-26
Attending: UROLOGY

## 2024-11-26 ENCOUNTER — TELEPHONE (OUTPATIENT)
Dept: UROLOGY | Facility: MEDICAL CENTER | Age: 69
End: 2024-11-26

## 2024-11-26 DIAGNOSIS — C61 PROSTATE CANCER (HCC): ICD-10-CM

## 2024-11-26 NOTE — TELEPHONE ENCOUNTER
----- Message from Ashley ALCOCER sent at 11/26/2024  8:52 AM EST -----  Pls try to schedule this pt with Jose Alejandro on 12/12 for PET scan results.  30 minutes.  Thanks  ----- Message -----  From: Frankie Blount MD  Sent: 11/22/2024   5:04 PM EST  To: Ashley Yoder RN    Ordered Prolaris test.  Patient is going to [facility] from December 9 until the middle of January.  Please arrange for PET scan to be performed prior to him leaving in December.  He can see consultants after he returns and then return to me shortly thereafter for further discussion and decision for either radiation or surgery if appropriate

## 2024-11-27 NOTE — PROGRESS NOTES
Spoke with Daughter in law Shona.    Oncology Nurse Navigator made call to patient due to referral to provider within [facility]. I spoke with patient about my role as an Oncology Nurse Navigator. I explained how to reach the office for any routine or urgent matters and the availability of patient navigation for non urgent matters. Explained oncology navigation is here to help patients through their journey and to offer assistance with any barriers to care. As a team, we strive to be patient advocates and help navigate healthcare system. Patient aware that medical oncology nursing will be primary contact once consult is complete and patient navigator will continue to offer support through entire journey. Conversation is based on evidence based care to optimize patient outcomes. Emotional support provided throughout conversation.       Evaluated for any barriers to care.   Distress thermometer completed by PN   Genetic testing not indicated   Smoking status verified non smoker   Prior cancer and radiation history negative   Provided contact information for nurse navigator and patient navigator  Verified PCP/insurance on file  Discussed use of My Chart patient uses regularly     Answered questions to pt's satisfaction.  Reviewed upcoming appts. Provided support and provided direct contact information for any questions or concerns.       Future Appointments   Date Time Provider Department Center   12/2/2024  1:00 PM  PET 1  PET [facility]   12/3/2024 10:00 AM Dustin Weaver MD [facility] Rad Onc [facility]   1/29/2025 11:00 AM Mao Wolf MD [facility]  [facility]

## 2024-12-02 ENCOUNTER — HOSPITAL ENCOUNTER (OUTPATIENT)
Dept: NUCLEAR MEDICINE | Facility: HOSPITAL | Age: 69
Discharge: HOME | End: 2024-12-02
Attending: UROLOGY
Payer: COMMERCIAL

## 2024-12-02 PROCEDURE — 78815 PET IMAGE W/CT SKULL-THIGH: CPT

## 2024-12-02 PROCEDURE — A9595 HB PIFLUFOLASTAT F-18, DIAGNOSTIC, 1 MILLICURIE: HCPCS

## 2024-12-03 ENCOUNTER — CONSULT (OUTPATIENT)
Dept: RADIATION ONCOLOGY | Facility: CLINIC | Age: 69
End: 2024-12-03
Payer: COMMERCIAL

## 2024-12-03 VITALS
BODY MASS INDEX: 26.46 KG/M2 | SYSTOLIC BLOOD PRESSURE: 135 MMHG | TEMPERATURE: 97.7 F | HEART RATE: 72 BPM | WEIGHT: 189 LBS | RESPIRATION RATE: 16 BRPM | DIASTOLIC BLOOD PRESSURE: 76 MMHG | HEIGHT: 71 IN | OXYGEN SATURATION: 98 %

## 2024-12-03 DIAGNOSIS — C61 PROSTATE CANCER (HCC): ICD-10-CM

## 2024-12-03 PROCEDURE — 99205 OFFICE O/P NEW HI 60 MIN: CPT | Performed by: RADIOLOGY

## 2024-12-03 PROCEDURE — 99211 OFF/OP EST MAY X REQ PHY/QHP: CPT | Performed by: RADIOLOGY

## 2024-12-03 NOTE — PROGRESS NOTES
Consultation Visit Name: Ted Gale      : 1955      MRN: [id number]  Encounter Provider: Dustin Weaver MD  Encounter Date: 12/3/2024   Encounter department: [facility] RADIATION ONCOLOGY       Cancer Staging   Prostate cancer (HCC)  Staging form: Prostate, AJCC 8th Edition  - Clinical stage from 12/3/2024: Stage IIB (cT1c, cN0, cM0, PSA: 4.3, Grade Group: 2) - Signed by Dustin Weaver MD on 12/3/2024  :  Assessment & Plan  Prostate cancer (HCC)    Orders:    Ambulatory Referral to Radiation Oncology  Ted Gale 1955 is a 69 y.o. male with a clinical stage IIB, T1 cN0 M0 Johnny 7 (3+4) prostate adenocarcinoma.  He had workup with MRI of the prostate in addition to PET/CT study which reveals no evidence of any pelvic lymphadenopathy, no seminal vesicle involvement as well as no bone metastasis nor distant metastatic disease.  His PSA level was slightly elevated at 4.282 on 2024.  His biopsies confirmed [place] score 3+4=7 disease on the left side with perineural invasion identified.  Prolaris testing was ordered and is pending.  We discussed treatment options with him to include robotic prostatectomy versus definitive radiation therapy.  He has appointment with Dr. Wolf to discuss surgery on 2025.  We discussed definitive radiation therapy using intensity modulated radiation therapy with daily image guided radiation therapy with placement of fiducial markers.  We also discussed placement of a SpaceOAR to reduce dose to the rectum.  We recommend definitive radiation therapy alone without any ADT with a hypofractionated course of treatment over 5-1/2 weeks/28 fractions.  We discussed the rationale for treatment including the acute side effects and the potential chronic complications with him.  He wants to think about his treatment options regarding surgery versus definitive radiation therapy.  He does have a 1 month trip  planned to [facility] and will not be returning until  the middle of January 2025.  He will call us once he makes his final decision regarding treatment.        History of Present Illness   Chief Complaint   Patient presents with    Consult     Pertinent Medical History   Ted Gale 1955 is a 69 y.o. maleWith h/o recently diagnosed [place] 7 (3+4) prostate cancer, presents in consultation for discussion of RT.  Referred by Dr. Blount.  PMH includes  HTN,  DM2, BPH     PSA Trend:    PSA   Latest Ref Rng 0.000 - 4.000 ng/mL   2/13/2022 2.7    3/26/2024 4.14 (H)    8/5/2024 4.282 (H)       7/11/24  [facility]  GOSIA: 50 g prostate  flat, without nodule or asymmetry  Recheck PSA in 2-3 weeks.  MRI if elevated     8/19/24  MRI of Prostate  IMPRESSION:  1. PI-RADSv2.1 Category 4 - High (clinically significant cancer is likely to be present).  2. No extraprostatic tumor, seminal vesicle invasion, pelvic lymphadenopathy, or pelvic osseous metastatic disease.  3. Calculated prostate volume of 44 mL.     11/13/24  Tissue Exam  A. Prostate, RIGHT ANTERIOR MEDIAL:  - Benign prostatic tissue.       B. Prostate, Right Anterior lateral:  - Prostatic tissue with small atypical focus, cannot exclude partial atrophy.  - Prostatic multiplex stain supports interpretation.      C. Prostate, Right posterior lateral:  - Benign prostatic tissue.      D. Prostate, Left Anterior Medial:  - Benign prostatic tissue.       E. Prostate, left anterior lateral:  - Benign prostatic tissue.       F. Prostate, Left posterior lateral:  - Prostatic adenocarcinoma, Johnny score 7, 3+4, Prognostic Grade Group 2, continuously involving 1 of 2 cores and about 30% of tissue.  - Percentage pattern 4: 20%, non cribriform.  - Periprostatic fat invasion: Not identified  - Lymph-vascular invasion: Not identified  - Perineural invasion: Not identified  - Additional Pathologic Findings: None identified     G. Prostate, left posterior medial:  -  Prostatic adenocarcinoma, Johnny score 7, 3+4, Prognostic Grade Group 2, discontinuously involving 2 of 2 cores and about 40% of tissue.  - Percentage pattern 4: 30%, non cribriform.  - Periprostatic fat invasion: Not identified  - Lymph-vascular invasion: Not identified  - Perineural invasion: Identified  - Additional Pathologic Findings: High grade prostatic intraepithelial carcinoma. Prostatic multiplex stain supports interpretation.     H. Prostate, Left base:  - Prostatic adenocarcinoma, [place] score 7, 3+4, Prognostic Grade Group 2, discontinuously involving 2 of 2 cores and about 40% of tissue.  - Percentage pattern 4: 40%, focal cribriform pattern noted.  - Periprostatic fat invasion: Not identified  - Lymph-vascular invasion: Not identified  - Perineural invasion: Not identified  - Additional Pathologic Findings: Prostatic multiplex stain supports interpretation.     I. Prostate, right posterior medial:  - Benign prostatic tissue.      J. Prostate, right base:  - Benign prostatic tissue.       K. Prostate, left transitional zone:  - Benign prostatic tissue.       L. Prostate, region of interest:  - Benign prostatic tissue.       M. Prostate, right transitional zone:  - Benign prostatic tissue.      11/22/24  Dr. Blount  Pathology reviewed.  Prolaris testing ordered.  Will see urologic surgeon and radiation oncology     12/2/2024 NM PET CT SKULL base to mid thigh  IMPRESSION:     1. Radiotracer avid left posterior prostate peripheral zone lesion, corresponding to findings on MRI and recently diagnosed prostate carcinoma  2. No evidence of radiotracer avid pelvic adenopathy or abnormal seminal vesicle activity  3. No evidence of distant radiotracer avid metastatic disease     Patient is seen for consultation today with his daughter who provided translation.  He denies any previous history of cancer including no skin cancer.  He denies any previous radiation therapy nor any chemotherapy.  He currently  lives with his 67-year-old wife as well as his son.  He has 3 children with 2 daughters and a 33-year-old son that are all healthy.  His father  from Alzheimer's at the age of 70 in [place].  His mother  from complications of diabetes and hypertension at the age of 75.  He plans to travel to [facility] for 1 month in mid December returning in mid January.  He reports a good urinary stream with nocturia x 2 and no hematuria.    Upcomin25  Dr. Wolf      Oncology History   Oncology History   Prostate cancer ([facility])   2024 Initial Diagnosis    Prostate cancer ([facility])     2024 Biopsy    A. Prostate, RIGHT ANTERIOR MEDIAL:  - Benign prostatic tissue.       B. Prostate, Right Anterior lateral:  - Prostatic tissue with small atypical focus, cannot exclude partial atrophy.  - Prostatic multiplex stain supports interpretation.      C. Prostate, Right posterior lateral:  - Benign prostatic tissue.      D. Prostate, Left Anterior Medial:  - Benign prostatic tissue.       E. Prostate, left anterior lateral:  - Benign prostatic tissue.       F. Prostate, Left posterior lateral:  - Prostatic adenocarcinoma, Johnny score 7, 3+4, Prognostic Grade Group 2, continuously involving 1 of 2 cores and about 30% of tissue.  - Percentage pattern 4: 20%, non cribriform.  - Periprostatic fat invasion: Not identified  - Lymph-vascular invasion: Not identified  - Perineural invasion: Not identified  - Additional Pathologic Findings: None identified     G. Prostate, left posterior medial:  - Prostatic adenocarcinoma, [place] score 7, 3+4, Prognostic Grade Group 2, discontinuously involving 2 of 2 cores and about 40% of tissue.  - Percentage pattern 4: 30%, non cribriform.  - Periprostatic fat invasion: Not identified  - Lymph-vascular invasion: Not identified  - Perineural invasion: Identified  - Additional Pathologic Findings: High grade prostatic intraepithelial carcinoma. Prostatic multiplex stain supports  interpretation.     H. Prostate, Left base:  - Prostatic adenocarcinoma, [place] score 7, 3+4, Prognostic Grade Group 2, discontinuously involving 2 of 2 cores and about 40% of tissue.  - Percentage pattern 4: 40%, focal cribriform pattern noted.  - Periprostatic fat invasion: Not identified  - Lymph-vascular invasion: Not identified  - Perineural invasion: Not identified  - Additional Pathologic Findings: Prostatic multiplex stain supports interpretation.     I. Prostate, right posterior medial:  - Benign prostatic tissue.      J. Prostate, right base:  - Benign prostatic tissue.       K. Prostate, left transitional zone:  - Benign prostatic tissue.       L. Prostate, region of interest:  - Benign prostatic tissue.       M. Prostate, right transitional zone:  - Benign prostatic tissue.      12/3/2024 -  Cancer Staged    Staging form: Prostate, AJCC 8th Edition  - Clinical stage from 12/3/2024: Stage IIB (cT1c, cN0, cM0, PSA: 4.3, Grade Group: 2) - Signed by Dustin Weaver MD on 12/3/2024  Histopathologic type: Adenocarcinoma, NOS  Stage prefix: Initial diagnosis  Prostate specific antigen (PSA) range: Less than 10  [place] primary pattern: 3  [place] secondary pattern: 4  [place] score: 7  Histologic grading system: 5 grade system          Review of Systems Refer to nursing note.    Past Medical History   Past Medical History:   Diagnosis Date    Diabetes mellitus (HCC)     Elevated PSA     Full dentures     Hyperlipidemia     Hypertension     Language barrier     PANJABI    Wears glasses      Past Surgical History:   Procedure Laterality Date    NO PAST SURGERIES      IL PROSTATE NEEDLE BIOPSY ANY APPROACH N/A 11/13/2024    Procedure: TRANSPERINEAL MRI FUSION BIOPSY PROSTATE;  Surgeon: Frankie Blount MD;  Location: [facility] OR;  Service: Urology     Family History   Problem Relation Age of Onset    Diabetes Mother       reports that he has quit smoking. His smoking use included cigarettes. He has  "never used smokeless tobacco. He reports that he does not currently use alcohol. He reports that he does not use drugs.  Current Outpatient Medications on File Prior to Visit   Medication Sig Dispense Refill    atenolol (TENORMIN) 50 mg tablet Take 50 mg by mouth daily      ergocalciferol (VITAMIN D2) 50,000 units Take 50,000 Units by mouth once a week      metFORMIN (GLUCOPHAGE) 500 mg tablet Take 500 mg by mouth 2 (two) times a day with meals With morning and evening      tamsulosin (FLOMAX) 0.4 mg Take 1 capsule (0.4 mg total) by mouth daily at bedtime 90 capsule 1    amLODIPine (NORVASC) 5 mg tablet Take 5 mg by mouth daily (Patient not taking: Reported on 12/3/2024)       No current facility-administered medications on file prior to visit.   No Known Allergies   Current Outpatient Medications on File Prior to Visit   Medication Sig Dispense Refill    atenolol (TENORMIN) 50 mg tablet Take 50 mg by mouth daily      ergocalciferol (VITAMIN D2) 50,000 units Take 50,000 Units by mouth once a week      metFORMIN (GLUCOPHAGE) 500 mg tablet Take 500 mg by mouth 2 (two) times a day with meals With morning and evening      tamsulosin (FLOMAX) 0.4 mg Take 1 capsule (0.4 mg total) by mouth daily at bedtime 90 capsule 1    amLODIPine (NORVASC) 5 mg tablet Take 5 mg by mouth daily (Patient not taking: Reported on 12/3/2024)       No current facility-administered medications on file prior to visit.      Social History     Tobacco Use    Smoking status: Former     Types: Cigarettes    Smokeless tobacco: Never    Tobacco comments:     Quit before 1984   Vaping Use    Vaping status: Never Used   Substance and Sexual Activity    Alcohol use: Not Currently    Drug use: Never    Sexual activity: Not on file     Comment: defer        Objective   /76 (BP Location: Left arm)   Pulse 72   Temp 97.7 °F (36.5 °C) (Temporal)   Resp 16   Ht 5' 11\" (1.803 m)   Wt 85.7 kg (189 lb)   SpO2 98%   BMI 26.36 kg/m²     Pain Screening: "  Pain Score: 0-No pain  ECOG ECOG Performance Status: 0 - Fully active, able to carry on all pre-disease performance without restriction  Physical Exam  Vitals and nursing note reviewed.   Constitutional:       General: He is not in acute distress.     Appearance: Normal appearance. He is well-developed. He is not diaphoretic.   HENT:      Head: Normocephalic and atraumatic.      Mouth/Throat:      Pharynx: No oropharyngeal exudate.   Eyes:      General: No scleral icterus.     Conjunctiva/sclera: Conjunctivae normal.      Pupils: Pupils are equal, round, and reactive to light.   Neck:      Thyroid: No thyromegaly.      Trachea: No tracheal deviation.   Cardiovascular:      Rate and Rhythm: Normal rate and regular rhythm.      Heart sounds: Normal heart sounds.   Pulmonary:      Effort: Pulmonary effort is normal. No respiratory distress.      Breath sounds: Normal breath sounds. No stridor. No wheezing, rhonchi or rales.   Chest:      Chest wall: No tenderness.   Abdominal:      General: Bowel sounds are normal. There is no distension.      Palpations: Abdomen is soft. There is no mass.      Tenderness: There is no abdominal tenderness. There is no rebound.      Hernia: No hernia is present.   Genitourinary:     Comments: Rectal examination deferred.  Musculoskeletal:         General: No swelling or tenderness. Normal range of motion.      Cervical back: Normal range of motion and neck supple. No tenderness.   Lymphadenopathy:      Cervical: No cervical adenopathy.      Upper Body:      Right upper body: No supraclavicular adenopathy.      Left upper body: No supraclavicular adenopathy.      Lower Body: No right inguinal adenopathy. No left inguinal adenopathy.   Skin:     General: Skin is warm and dry.      Coloration: Skin is not jaundiced or pale.      Findings: No erythema or rash.   Neurological:      General: No focal deficit present.      Mental Status: He is alert and oriented to person, place, and time.       Cranial Nerves: No cranial nerve deficit.      Sensory: No sensory deficit.      Motor: No weakness.      Coordination: Coordination normal.   Psychiatric:         Mood and Affect: Mood normal.         Behavior: Behavior normal.         Thought Content: Thought content normal.         Judgment: Judgment normal.       Radiology Results: I have reviewed radiology images/reports described above.   Pathology: I have reviewed pathology reports described above.    Administrative Statements   I have spent a total time of 55 minutes in caring for this patient on the day of the visit/encounter including Diagnostic results, Prognosis, Risks and benefits of tx options, Instructions for management, Patient and family education, Impressions, Documenting in the medical record, Reviewing / ordering tests, medicine, procedures  , and Obtaining or reviewing history  .

## 2024-12-03 NOTE — ASSESSMENT & PLAN NOTE
Orders:    Ambulatory Referral to Radiation Oncology  Ted Gale 1955 is a 69 y.o. male with a clinical stage IIB, T1 cN0 M0 Johnny 7 (3+4) prostate adenocarcinoma.  He had workup with MRI of the prostate in addition to PET/CT study which reveals no evidence of any pelvic lymphadenopathy, no seminal vesicle involvement as well as no bone metastasis nor distant metastatic disease.  His PSA level was slightly elevated at 4.282 on August 5, 2024.  His biopsies confirmed Johnny score 3+4=7 disease on the left side with perineural invasion identified.  Prolaris testing was ordered and is pending.  We discussed treatment options with him to include robotic prostatectomy versus definitive radiation therapy.  He has appointment with Dr. Wolf to discuss surgery on January 29, 2025.  We discussed definitive radiation therapy using intensity modulated radiation therapy with daily image guided radiation therapy with placement of fiducial markers.  We also discussed placement of a SpaceOAR to reduce dose to the rectum.  We recommend definitive radiation therapy alone without any ADT with a hypofractionated course of treatment over 5-1/2 weeks/28 fractions.  We discussed the rationale for treatment including the acute side effects and the potential chronic complications with him.  He wants to think about his treatment options regarding surgery versus definitive radiation therapy.  He does have a 1 month trip planned to [facility] and will not be returning until  the middle of January 2025.  He will call us once he makes his final decision regarding treatment.

## 2024-12-03 NOTE — PROGRESS NOTES
Ted Gale 1955 is a 69 y.o. maleWith h/o recently diagnosed [place] 7 (3+4) prostate cancer, presents in consultation for discussion of RT.  Referred by Dr. Blount.  PMH includes  HTN,  DM2, BPH    PSA Trend:   PSA   Latest Ref Rng 0.000 - 4.000 ng/mL   2/13/2022 2.7    3/26/2024 4.14 (H)    8/5/2024 4.282 (H)       7/11/24  [facility]  GOSIA: 50 g prostate  flat, without nodule or asymmetry  Recheck PSA in 2-3 weeks.  MRI if elevated    8/19/24  MRI of Prostate  IMPRESSION:  1. PI-RADSv2.1 Category 4 - High (clinically significant cancer is likely to be present).  2. No extraprostatic tumor, seminal vesicle invasion, pelvic lymphadenopathy, or pelvic osseous metastatic disease.  3. Calculated prostate volume of 44 mL.    11/13/24  Tissue Exam  A. Prostate, RIGHT ANTERIOR MEDIAL:  - Benign prostatic tissue.       B. Prostate, Right Anterior lateral:  - Prostatic tissue with small atypical focus, cannot exclude partial atrophy.  - Prostatic multiplex stain supports interpretation.      C. Prostate, Right posterior lateral:  - Benign prostatic tissue.      D. Prostate, Left Anterior Medial:  - Benign prostatic tissue.       E. Prostate, left anterior lateral:  - Benign prostatic tissue.       F. Prostate, Left posterior lateral:  - Prostatic adenocarcinoma, [place] score 7, 3+4, Prognostic Grade Group 2, continuously involving 1 of 2 cores and about 30% of tissue.  - Percentage pattern 4: 20%, non cribriform.  - Periprostatic fat invasion: Not identified  - Lymph-vascular invasion: Not identified  - Perineural invasion: Not identified  - Additional Pathologic Findings: None identified     G. Prostate, left posterior medial:  - Prostatic adenocarcinoma, [place] score 7, 3+4, Prognostic Grade Group 2, discontinuously involving 2 of 2 cores and about 40% of tissue.  - Percentage pattern 4: 30%, non cribriform.  - Periprostatic fat invasion: Not identified  - Lymph-vascular invasion: Not  identified  - Perineural invasion: Identified  - Additional Pathologic Findings: High grade prostatic intraepithelial carcinoma. Prostatic multiplex stain supports interpretation.     H. Prostate, Left base:  - Prostatic adenocarcinoma, Johnny score 7, 3+4, Prognostic Grade Group 2, discontinuously involving 2 of 2 cores and about 40% of tissue.  - Percentage pattern 4: 40%, focal cribriform pattern noted.  - Periprostatic fat invasion: Not identified  - Lymph-vascular invasion: Not identified  - Perineural invasion: Not identified  - Additional Pathologic Findings: Prostatic multiplex stain supports interpretation.     I. Prostate, right posterior medial:  - Benign prostatic tissue.      J. Prostate, right base:  - Benign prostatic tissue.       K. Prostate, left transitional zone:  - Benign prostatic tissue.       L. Prostate, region of interest:  - Benign prostatic tissue.       M. Prostate, right transitional zone:  - Benign prostatic tissue.     24  Dr. Blount  Pathology reviewed.  Prolaris testing ordered.  Will see urologic surgeon and radiation oncology    2024 NM PET CT SKULL base to mid thigh  IMPRESSION:     1. Radiotracer avid left posterior prostate peripheral zone lesion, corresponding to findings on MRI and recently diagnosed prostate carcinoma  2. No evidence of radiotracer avid pelvic adenopathy or abnormal seminal vesicle activity  3. No evidence of distant radiotracer avid metastatic disease       Upcomin25  Dr. Wolf      Daughter Translating at visit.     Oncology History Overview Note   With h/o recently diagnosed [place] 7 (3+4) prostate cancer, presents in consultation for discussion of RT.  Referred by Dr. Blount.  PMH includes  HTN,  DM2, BPH    PSA Trend:   PSA   Latest Ref Rng 0.000 - 4.000 ng/mL   2022 2.7    3/26/2024 4.14 (H)    2024 4.282 (H)       24  [facility]  GOSIA: 50 g prostate  flat, without nodule or asymmetry  Recheck PSA  in 2-3 weeks.  MRI if elevated    8/19/24  MRI of Prostate  IMPRESSION:  1. PI-RADSv2.1 Category 4 - High (clinically significant cancer is likely to be present).  2. No extraprostatic tumor, seminal vesicle invasion, pelvic lymphadenopathy, or pelvic osseous metastatic disease.  3. Calculated prostate volume of 44 mL.    11/13/24  Tissue Exam  A. Prostate, RIGHT ANTERIOR MEDIAL:  - Benign prostatic tissue.       B. Prostate, Right Anterior lateral:  - Prostatic tissue with small atypical focus, cannot exclude partial atrophy.  - Prostatic multiplex stain supports interpretation.      C. Prostate, Right posterior lateral:  - Benign prostatic tissue.      D. Prostate, Left Anterior Medial:  - Benign prostatic tissue.       E. Prostate, left anterior lateral:  - Benign prostatic tissue.       F. Prostate, Left posterior lateral:  - Prostatic adenocarcinoma, Johnny score 7, 3+4, Prognostic Grade Group 2, continuously involving 1 of 2 cores and about 30% of tissue.  - Percentage pattern 4: 20%, non cribriform.  - Periprostatic fat invasion: Not identified  - Lymph-vascular invasion: Not identified  - Perineural invasion: Not identified  - Additional Pathologic Findings: None identified     G. Prostate, left posterior medial:  - Prostatic adenocarcinoma, [place] score 7, 3+4, Prognostic Grade Group 2, discontinuously involving 2 of 2 cores and about 40% of tissue.  - Percentage pattern 4: 30%, non cribriform.  - Periprostatic fat invasion: Not identified  - Lymph-vascular invasion: Not identified  - Perineural invasion: Identified  - Additional Pathologic Findings: High grade prostatic intraepithelial carcinoma. Prostatic multiplex stain supports interpretation.     H. Prostate, Left base:  - Prostatic adenocarcinoma, [place] score 7, 3+4, Prognostic Grade Group 2, discontinuously involving 2 of 2 cores and about 40% of tissue.  - Percentage pattern 4: 40%, focal cribriform pattern noted.  - Periprostatic fat invasion:  Not identified  - Lymph-vascular invasion: Not identified  - Perineural invasion: Not identified  - Additional Pathologic Findings: Prostatic multiplex stain supports interpretation.     I. Prostate, right posterior medial:  - Benign prostatic tissue.      J. Prostate, right base:  - Benign prostatic tissue.       K. Prostate, left transitional zone:  - Benign prostatic tissue.       L. Prostate, region of interest:  - Benign prostatic tissue.       M. Prostate, right transitional zone:  - Benign prostatic tissue.     24  Dr. Blount  Pathology reviewed.  Prolaris testing ordered.  Will see urologic surgeon and radiation oncology    2024 NM PET CT SKULL base to mid thigh  IMPRESSION:     1. Radiotracer avid left posterior prostate peripheral zone lesion, corresponding to findings on MRI and recently diagnosed prostate carcinoma  2. No evidence of radiotracer avid pelvic adenopathy or abnormal seminal vesicle activity  3. No evidence of distant radiotracer avid metastatic disease       Upcomin25  Dr. Wolf       Prostate cancer ([facility])   2024 Initial Diagnosis    Prostate cancer ([facility])     2024 Biopsy    A. Prostate, RIGHT ANTERIOR MEDIAL:  - Benign prostatic tissue.       B. Prostate, Right Anterior lateral:  - Prostatic tissue with small atypical focus, cannot exclude partial atrophy.  - Prostatic multiplex stain supports interpretation.      C. Prostate, Right posterior lateral:  - Benign prostatic tissue.      D. Prostate, Left Anterior Medial:  - Benign prostatic tissue.       E. Prostate, left anterior lateral:  - Benign prostatic tissue.       F. Prostate, Left posterior lateral:  - Prostatic adenocarcinoma, [place] score 7, 3+4, Prognostic Grade Group 2, continuously involving 1 of 2 cores and about 30% of tissue.  - Percentage pattern 4: 20%, non cribriform.  - Periprostatic fat invasion: Not identified  - Lymph-vascular invasion: Not identified  - Perineural invasion: Not  identified  - Additional Pathologic Findings: None identified     G. Prostate, left posterior medial:  - Prostatic adenocarcinoma, [place] score 7, 3+4, Prognostic Grade Group 2, discontinuously involving 2 of 2 cores and about 40% of tissue.  - Percentage pattern 4: 30%, non cribriform.  - Periprostatic fat invasion: Not identified  - Lymph-vascular invasion: Not identified  - Perineural invasion: Identified  - Additional Pathologic Findings: High grade prostatic intraepithelial carcinoma. Prostatic multiplex stain supports interpretation.     H. Prostate, Left base:  - Prostatic adenocarcinoma, [place] score 7, 3+4, Prognostic Grade Group 2, discontinuously involving 2 of 2 cores and about 40% of tissue.  - Percentage pattern 4: 40%, focal cribriform pattern noted.  - Periprostatic fat invasion: Not identified  - Lymph-vascular invasion: Not identified  - Perineural invasion: Not identified  - Additional Pathologic Findings: Prostatic multiplex stain supports interpretation.     I. Prostate, right posterior medial:  - Benign prostatic tissue.      J. Prostate, right base:  - Benign prostatic tissue.       K. Prostate, left transitional zone:  - Benign prostatic tissue.       L. Prostate, region of interest:  - Benign prostatic tissue.       M. Prostate, right transitional zone:  - Benign prostatic tissue.          Review of Systems:  Review of Systems   Constitutional:  Negative for activity change, appetite change and fatigue.   HENT: Negative.     Eyes: Negative.         Patient wears glasses    Respiratory: Negative.     Cardiovascular: Negative.    Gastrointestinal: Negative.  Negative for blood in stool, constipation, diarrhea, nausea and vomiting.   Endocrine: Negative.    Genitourinary:  Positive for frequency and urgency. Negative for difficulty urinating and dysuria.   Musculoskeletal: Negative.    Skin: Negative.    Allergic/Immunologic: Negative.    Neurological: Negative.    Hematological: Negative.     Psychiatric/Behavioral: Negative.         Clinical Trial: no    IPSS Questionnaire (AUA-7):  Over the past month…    1)  How often have you had a sensation of not emptying your bladder completely after you finish urinating?  0 - Not at all   2)  How often have you had to urinate again less than two hours after you finished urinating? 4 - More than half the time   3)  How often have you found you stopped and started again several times when you urinated?  0 - Not at all   4) How difficult have you found it to postpone urination?  3 - About half the time   5) How often have you had a weak urinary stream?  0 - Not at all   6) How often have you had to push or strain to begin urination?  0 - Not at all   7) How many times did you most typically get up to urinate from the time you went to bed until the time you got up in the morning?  2 - 2 times   Total Score:  9           PSA 4.282         Prior Radiation none     Teaching NCL booklet ,SIM, Side effects    MST completed     Implantable Devices (Port, pacemaker, pain stimulator) none     Hip Replacement none     Health Maintenance   Topic Date Due    Hepatitis C Screening  Never done    Medicare Annual Wellness Visit (AWV)  Never done    Kidney Health Evaluation: Albumin/Creatinine Ratio  Never done    Pneumococcal Vaccine: 65+ Years (1 of 2 - PCV) Never done    Diabetic Foot Exam  Never done    DM Eye Exam  Never done    Depression Screening  Never done    BMI: Followup Plan  Never done    Zoster Vaccine (1 of 2) Never done    Fall Risk  Never done    COVID-19 Vaccine (4 - 2024-25 season) 09/01/2024    HEMOGLOBIN A1C  04/28/2025    Kidney Health Evaluation: GFR  10/28/2025    BMI: Adult  11/13/2025    Colorectal Cancer Screening  02/15/2026    RSV Vaccine Age 60+ Years (1 - 1-dose 75+ series) 11/20/2030    Influenza Vaccine  Completed    RSV Vaccine age 0-20 Months  Aged Out    HIB Vaccine  Aged Out    IPV Vaccine  Aged Out    Hepatitis A Vaccine  Aged Out     Meningococcal ACWY Vaccine  Aged Out    HPV Vaccine  Aged Out       Past Medical History:   Diagnosis Date    Diabetes mellitus (HCC)     Elevated PSA     Full dentures     Hyperlipidemia     Hypertension     Language barrier     PANJABI    Wears glasses        Past Surgical History:   Procedure Laterality Date    NO PAST SURGERIES      OR PROSTATE NEEDLE BIOPSY ANY APPROACH N/A 11/13/2024    Procedure: TRANSPERINEAL MRI FUSION BIOPSY PROSTATE;  Surgeon: Frankie Blount MD;  Location: [facility];  Service: Urology       Family History   Problem Relation Age of Onset    Diabetes Mother        Social History     Tobacco Use    Smoking status: Former     Types: Cigarettes    Smokeless tobacco: Never    Tobacco comments:     Quit before 1984   Vaping Use    Vaping status: Never Used   Substance Use Topics    Alcohol use: Not Currently    Drug use: Never          Current Outpatient Medications:     amLODIPine (NORVASC) 5 mg tablet, Take 5 mg by mouth daily, Disp: , Rfl:     atenolol (TENORMIN) 50 mg tablet, Take 50 mg by mouth daily, Disp: , Rfl:     ergocalciferol (VITAMIN D2) 50,000 units, Take 50,000 Units by mouth once a week, Disp: , Rfl:     metFORMIN (GLUCOPHAGE) 500 mg tablet, Take 500 mg by mouth 2 (two) times a day with meals With morning and evening, Disp: , Rfl:     tamsulosin (FLOMAX) 0.4 mg, Take 1 capsule (0.4 mg total) by mouth daily at bedtime, Disp: 90 capsule, Rfl: 1    No Known Allergies     There were no vitals filed for this visit.

## 2024-12-05 ENCOUNTER — TELEPHONE (OUTPATIENT)
Dept: UROLOGY | Facility: MEDICAL CENTER | Age: 69
End: 2024-12-05

## 2024-12-20 ENCOUNTER — LAB REQUISITION (OUTPATIENT)
Dept: LAB | Facility: HOSPITAL | Age: 69
End: 2024-12-20

## 2024-12-20 DIAGNOSIS — R97.20 ELEVATED PROSTATE SPECIFIC ANTIGEN (PSA): ICD-10-CM

## 2024-12-20 LAB — SCAN RESULT: NORMAL

## 2024-12-30 ENCOUNTER — PATIENT OUTREACH (OUTPATIENT)
Dept: HEMATOLOGY ONCOLOGY | Facility: CLINIC | Age: 69
End: 2024-12-30

## 2024-12-31 ENCOUNTER — PATIENT OUTREACH (OUTPATIENT)
Dept: HEMATOLOGY ONCOLOGY | Facility: CLINIC | Age: 69
End: 2024-12-31

## 2025-01-03 ENCOUNTER — PATIENT OUTREACH (OUTPATIENT)
Dept: HEMATOLOGY ONCOLOGY | Facility: CLINIC | Age: 70
End: 2025-01-03

## 2025-01-03 NOTE — PROGRESS NOTES
3rd outreach made to patient/daughter in law. I LM introducing myself as  Oncology Patient Navigator. I let patient know I would like to introduce myself further and explain my role in his care moving forward. I gave my direct line and requested a call back from patient to assess barriers to care.     Patient has not made treatment decision at this time. He will be in [place] until Mid January.

## 2025-01-04 DIAGNOSIS — N40.1 BENIGN PROSTATIC HYPERPLASIA WITH URINARY FREQUENCY: ICD-10-CM

## 2025-01-04 DIAGNOSIS — R35.0 BENIGN PROSTATIC HYPERPLASIA WITH URINARY FREQUENCY: ICD-10-CM

## 2025-01-06 RX ORDER — TAMSULOSIN HYDROCHLORIDE 0.4 MG/1
0.4 CAPSULE ORAL
Qty: 90 CAPSULE | Refills: 1 | Status: SHIPPED | OUTPATIENT
Start: 2025-01-06

## 2025-01-29 ENCOUNTER — OFFICE VISIT (OUTPATIENT)
Dept: UROLOGY | Facility: MEDICAL CENTER | Age: 70
End: 2025-01-29
Payer: COMMERCIAL

## 2025-01-29 VITALS
HEART RATE: 72 BPM | OXYGEN SATURATION: 98 % | BODY MASS INDEX: 26.46 KG/M2 | WEIGHT: 189 LBS | DIASTOLIC BLOOD PRESSURE: 80 MMHG | HEIGHT: 71 IN | SYSTOLIC BLOOD PRESSURE: 140 MMHG

## 2025-01-29 DIAGNOSIS — C61 PROSTATE CANCER (HCC): ICD-10-CM

## 2025-01-29 PROCEDURE — 99214 OFFICE O/P EST MOD 30 MIN: CPT | Performed by: UROLOGY

## 2025-01-29 NOTE — PROGRESS NOTES
Name: Ted Gale      : 1955      MRN: [id number]  Encounter Provider: Mao Wolf MD  Encounter Date: 2025   [facility]  :  Assessment & Plan  Prostate cancer (HCC)  GG 2 prostate cancer  PSA 4.282  MRI: PI-RADS 4, 44 cc, no REGINE/SVI/LAD/pelvic osseous metastasis  PSMA PET (2024): no evidence of metastatic disease  Prolaris score: 3, low risk, risk of PCSM at 10 years 2%    Today we discussed his pathology from his prostate biopsy including definition of prostate cancer risk groups, a discussion of the [place] score and the meaning of his biopsy results in terms of his future management and overall health and treatment.    I had a discussion with the patient regarding the natural history and treatment options for prostate cancer and the potential need for multimodal treatments.  Active surveillance, surgical excision in the form of open or robotic surgery, and radiation therapies plus or minus androgen deprivation therapy were discussed at length with the patient. With regard to multimodal therapy discussion this could include surgical excision with postoperative radiation therapy (RT) +/-androgen deprivation therapy (ADT) if adverse pathology or RT with long-term ADT.     I discussed robot assisted laparoscopic radical prostatectomy with the inclusion or exclusion of bilateral pelvic lymph node dissection (depending on disease parameters and intraoperative findings) in depth with the patient.      I discussed that surgery has potential advantages compared with radiation including the more accurate prognostic, pathologic information afforded by the surgical pathology, the more immediate indication of effective treatment as indicated by an undetectable PSA, as well as the relatively easier use of adjuvant or salvage radiotherapy postoperatively if the need arises.     I discussed that in comparison with open surgery that robotic  prostatectomy has the benefits of improved convalescence and decreased blood loss and appears to have equivalent cancer control rates and quality-of-life side effects such similar rates of urinary incontinence and erectile dysfunction.     I discussed that most men stay in the hospital for 1-2 days and go home with a catheter for 7-10 days. I discussed the side effects of surgery including stress incontinence, and I counseled him that most men leak urine immediately after surgery. I expect a stress incontinence rate at six months in the 10%-15% range, improving to 5%-10% in one year.  I did communicate that there additional treatments for urinary incontinence that may be persistent in the post-prostatectomy setting.    In terms of erectile dysfunction, we discussed the normal physiology of the erectile response as well as discussing the anatomy of the cavernous nerves.  I drew a diagram for him outlining the pelvic anatomy with regard to the bladder, prostate, and parasympathetic nerve plexus running laterally to the prostate.  I will do everything that I safely can to attempt a nerve-sparing procedure if deemed to be appropriate intraoperatively.  I did discuss with him that my priority is 1st to rid him of his cancer, 2nd to keep him dry, and 3rd to keep him with sexual function.  He does understand that his sexual function postoperatively is a function of his current sexual function preoperatively and is closely tied to his overall general health.  He understands that there are other therapies for erectile dysfunction the post-prostatectomy setting including oral therapies, injections, and penile prosthesis placement. Additionally, I did stress to him that his post-operative sexual function will not be as good as his preoperative sexual function due to the nature of prostate surgery. We discussed that men are surgically sterile after radical prostatectomy    I discussed other surgical complications including, but  not limited to, a bladder neck contracture rate in the 5%-10% range any time after surgery, a rectal injury rate of 1% or less, an open conversion rate in the 1% range, a lymphocele causing symptoms and/or requiring intervention in the 5% range, and a transfusion rate in the 1% to 2% range. Additionally, he may experience some degree of penile remodeling and/or loss of penile length after surgery.    He has participated in shared/informed decision-making model with regard to this major urologic surgery with risk.  All questions answered and addressed      Discussed that patient is a candidate for active surveillance, RALP or EBRT for his favorable intermediate risk prostate cancer with favorable low risk genetic Prolaris profile    Family to consider options and let office know        Orders:    Ambulatory Referral to Urology        History of Present Illness   Ted Gale is a 69 y.o. male who presents for surgical consultation for his prostate cancer    Also to review Prolaris score and discuss all management options for his prostate cancer    No new complaints at this time    Review of Systems   All other systems reviewed and are negative.    Past Medical History   Past Medical History:   Diagnosis Date    Diabetes mellitus (HCC)     Elevated PSA     Full dentures     Hyperlipidemia     Hypertension     Language barrier     PANJABI    Wears glasses      Past Surgical History:   Procedure Laterality Date    NO PAST SURGERIES      SD PROSTATE NEEDLE BIOPSY ANY APPROACH N/A 11/13/2024    Procedure: TRANSPERINEAL MRI FUSION BIOPSY PROSTATE;  Surgeon: Frankie Blount MD;  Location: [facility] OR;  Service: Urology     Family History   Problem Relation Age of Onset    Diabetes Mother       reports that he has quit smoking. His smoking use included cigarettes. He has never used smokeless tobacco. He reports that he does not currently use alcohol. He reports that he does not use drugs.  Current Outpatient Medications  "on File Prior to Visit   Medication Sig Dispense Refill    amLODIPine (NORVASC) 5 mg tablet Take 5 mg by mouth daily      atenolol (TENORMIN) 50 mg tablet Take 50 mg by mouth daily      ergocalciferol (VITAMIN D2) 50,000 units Take 50,000 Units by mouth once a week      metFORMIN (GLUCOPHAGE) 500 mg tablet Take 500 mg by mouth 2 (two) times a day with meals With morning and evening      tamsulosin (FLOMAX) 0.4 mg TAKE 1 CAPSULE BY MOUTH AT BEDTIME 90 capsule 1     No current facility-administered medications on file prior to visit.   No Known Allergies      Objective   /80 (BP Location: Left arm, Patient Position: Sitting, Cuff Size: Standard)   Pulse 72   Ht 5' 11\" (1.803 m)   Wt 85.7 kg (189 lb)   SpO2 98%   BMI 26.36 kg/m²     Physical Exam  Vitals and nursing note reviewed.   Constitutional:       General: He is not in acute distress.     Appearance: He is well-developed.   HENT:      Head: Normocephalic and atraumatic.   Eyes:      Conjunctiva/sclera: Conjunctivae normal.   Cardiovascular:      Rate and Rhythm: Normal rate and regular rhythm.      Heart sounds: No murmur heard.  Pulmonary:      Effort: Pulmonary effort is normal. No respiratory distress.      Breath sounds: Normal breath sounds.   Abdominal:      Palpations: Abdomen is soft.      Tenderness: There is no abdominal tenderness.   Musculoskeletal:         General: No swelling.      Cervical back: Neck supple.   Skin:     General: Skin is warm and dry.      Capillary Refill: Capillary refill takes less than 2 seconds.   Neurological:      Mental Status: He is alert.   Psychiatric:         Mood and Affect: Mood normal.          Results  Lab Results   Component Value Date    PSA 4.282 (H) 08/05/2024    PSA 4.14 (H) 03/26/2024    PSA 2.7 02/13/2022     Lab Results   Component Value Date    CALCIUM 9.7 10/28/2024    K 4.1 10/28/2024    CO2 29 10/28/2024     10/28/2024    BUN 12 10/28/2024    CREATININE 0.76 10/28/2024     Lab Results "   Component Value Date    WBC 6.58 10/28/2024    HGB 15.1 10/28/2024    HCT 46.9 10/28/2024    MCV 85 10/28/2024     10/28/2024       Office Urine Dip  No results found for this or any previous visit (from the past hour).]

## 2025-01-29 NOTE — ASSESSMENT & PLAN NOTE
GG 2 prostate cancer  PSA 4.282  MRI: PI-RADS 4, 44 cc, no REGINE/SVI/LAD/pelvic osseous metastasis  PSMA PET (12/2/2024): no evidence of metastatic disease  Prolaris score: 3, low risk, risk of PCSM at 10 years 2%    Today we discussed his pathology from his prostate biopsy including definition of prostate cancer risk groups, a discussion of the Johnny score and the meaning of his biopsy results in terms of his future management and overall health and treatment.    I had a discussion with the patient regarding the natural history and treatment options for prostate cancer and the potential need for multimodal treatments.  Active surveillance, surgical excision in the form of open or robotic surgery, and radiation therapies plus or minus androgen deprivation therapy were discussed at length with the patient. With regard to multimodal therapy discussion this could include surgical excision with postoperative radiation therapy (RT) +/-androgen deprivation therapy (ADT) if adverse pathology or RT with long-term ADT.     I discussed robot assisted laparoscopic radical prostatectomy with the inclusion or exclusion of bilateral pelvic lymph node dissection (depending on disease parameters and intraoperative findings) in depth with the patient.      I discussed that surgery has potential advantages compared with radiation including the more accurate prognostic, pathologic information afforded by the surgical pathology, the more immediate indication of effective treatment as indicated by an undetectable PSA, as well as the relatively easier use of adjuvant or salvage radiotherapy postoperatively if the need arises.     I discussed that in comparison with open surgery that robotic prostatectomy has the benefits of improved convalescence and decreased blood loss and appears to have equivalent cancer control rates and quality-of-life side effects such similar rates of urinary incontinence and erectile dysfunction.     I discussed  that most men stay in the hospital for 1-2 days and go home with a catheter for 7-10 days. I discussed the side effects of surgery including stress incontinence, and I counseled him that most men leak urine immediately after surgery. I expect a stress incontinence rate at six months in the 10%-15% range, improving to 5%-10% in one year.  I did communicate that there additional treatments for urinary incontinence that may be persistent in the post-prostatectomy setting.    In terms of erectile dysfunction, we discussed the normal physiology of the erectile response as well as discussing the anatomy of the cavernous nerves.  I drew a diagram for him outlining the pelvic anatomy with regard to the bladder, prostate, and parasympathetic nerve plexus running laterally to the prostate.  I will do everything that I safely can to attempt a nerve-sparing procedure if deemed to be appropriate intraoperatively.  I did discuss with him that my priority is 1st to rid him of his cancer, 2nd to keep him dry, and 3rd to keep him with sexual function.  He does understand that his sexual function postoperatively is a function of his current sexual function preoperatively and is closely tied to his overall general health.  He understands that there are other therapies for erectile dysfunction the post-prostatectomy setting including oral therapies, injections, and penile prosthesis placement. Additionally, I did stress to him that his post-operative sexual function will not be as good as his preoperative sexual function due to the nature of prostate surgery. We discussed that men are surgically sterile after radical prostatectomy    I discussed other surgical complications including, but not limited to, a bladder neck contracture rate in the 5%-10% range any time after surgery, a rectal injury rate of 1% or less, an open conversion rate in the 1% range, a lymphocele causing symptoms and/or requiring intervention in the 5% range, and a  transfusion rate in the 1% to 2% range. Additionally, he may experience some degree of penile remodeling and/or loss of penile length after surgery.    He has participated in shared/informed decision-making model with regard to this major urologic surgery with risk.  All questions answered and addressed      Discussed that patient is a candidate for active surveillance, RALP or EBRT for his favorable intermediate risk prostate cancer with favorable low risk genetic Prolaris profile    Family to consider options and let office know        Orders:    Ambulatory Referral to Urology

## 2025-02-10 ENCOUNTER — TELEPHONE (OUTPATIENT)
Age: 70
End: 2025-02-10

## 2025-02-10 DIAGNOSIS — C61 PROSTATE CANCER (HCC): Primary | ICD-10-CM

## 2025-02-10 NOTE — TELEPHONE ENCOUNTER
Does patient require a PSA prior to his next appointment? His last PSA was 8/5/2024. He has a second opinion appointment on 4/14. Please advise.

## 2025-02-10 NOTE — TELEPHONE ENCOUNTER
Pt daughter Mandip calling in regards to recent OV with Dr Wolf for prostate cancer discussion.     Pt daughter asking if pt should have repeat PSA completed and what timeframe.     Pt daughter also requested to make appt with Dr Heard for second opinion and scheduled for next available OV long on 4/14/25 at 1030am. Please review to see if pt is scheduled appropriately.     Pt CB- [phone] Allen (daughter)

## 2025-02-11 ENCOUNTER — TELEPHONE (OUTPATIENT)
Dept: UROLOGY | Facility: CLINIC | Age: 70
End: 2025-02-11

## 2025-02-11 NOTE — TELEPHONE ENCOUNTER
1st attempt, called patient daughter Allen attempting to confirm patients appointment with Dr. Heard on 4/14 @ 10:30 am and left a voicemail message to CB the office at [phone].     Allen Gale (Daughter)  [phone] (Mobile)

## 2025-04-05 ENCOUNTER — APPOINTMENT (OUTPATIENT)
Dept: LAB | Facility: HOSPITAL | Age: 70
End: 2025-04-05
Payer: COMMERCIAL

## 2025-04-05 DIAGNOSIS — C61 PROSTATE CANCER (HCC): ICD-10-CM

## 2025-04-05 LAB — PSA SERPL-MCNC: 4.47 NG/ML (ref 0–4)

## 2025-04-05 PROCEDURE — 36415 COLL VENOUS BLD VENIPUNCTURE: CPT

## 2025-04-05 PROCEDURE — 84153 ASSAY OF PSA TOTAL: CPT

## 2025-04-14 ENCOUNTER — OFFICE VISIT (OUTPATIENT)
Dept: UROLOGY | Facility: CLINIC | Age: 70
End: 2025-04-14
Payer: COMMERCIAL

## 2025-04-14 VITALS
BODY MASS INDEX: 25.62 KG/M2 | OXYGEN SATURATION: 99 % | WEIGHT: 183 LBS | HEART RATE: 99 BPM | DIASTOLIC BLOOD PRESSURE: 80 MMHG | SYSTOLIC BLOOD PRESSURE: 134 MMHG | HEIGHT: 71 IN

## 2025-04-14 DIAGNOSIS — C61 PROSTATE CANCER (HCC): Primary | ICD-10-CM

## 2025-04-14 PROCEDURE — 99213 OFFICE O/P EST LOW 20 MIN: CPT | Performed by: UROLOGY

## 2025-04-14 NOTE — PROGRESS NOTES
Referring Physician: Bubba Hernandez MD  A copy of this note was sent to the referring physician.       Diagnoses and all orders for this visit:    Prostate cancer (HCC)  -     MRI prostate multiparametric wo w contrast; Future  -     Creatinine, serum; Future  -     PSA Total, Diagnostic; Future            Assessment and plan:       1.  Grade group 2 prostate cancer  -Date of diagnosis November 2024 (transperineal fusion)  - Solitary 9 mm left mid lateral peripheral zone lesion on MRI abutting the prostatic capsule  - Negative PET, ordered by Dr. Blount  - Grade group 2, Falkner 3+4 equal 7, 5 total positive cores  - PSA 4.4, generally stable from 4.288 months ago  - Prolaris score, generally low risk    2.  BPH on tamsulosin    Today's visit was performed utilizing a certified .    I shared my opinion that it is generally safe to continue active surveillance for low risk low-volume grade group 2 prostate cancer with a favorable molecular profile, a low PSA less than 5 which has been stable over the past 8 months.  We reviewed the active surveillance protocol.  Patient and his nam daughter are comfortable proceeding with this    He will follow-up in 4 months with advanced practitioner team with a repeat MRI and PSA.  We will plan for repeat transperineal fusion biopsy approximately 1 year from his original (around November 2025).    Carson Heard MD      Chief Complaint     Second opinion prostate cancer      History of Present Illness     Ted Gale is a 69 y.o. presents accompanied by his daughter seeking additional opinion for prostate cancer on active surveillance    Detailed Urologic History     - please refer to HPI    Review of Systems     Review of Systems   Constitutional: Negative for activity change and fatigue.   HENT: Negative for congestion.    Eyes: Negative for visual disturbance.   Respiratory: Negative for shortness of breath and wheezing.    Cardiovascular:  Negative for chest pain and leg swelling.   Gastrointestinal: Negative for abdominal pain.   Endocrine: Negative for polyuria.   Genitourinary: Negative for dysuria, flank pain, hematuria and urgency.   Musculoskeletal: Negative for back pain.   Allergic/Immunologic: Negative for immunocompromised state.   Neurological: Negative for dizziness and numbness.   Psychiatric/Behavioral: Negative for dysphoric mood.   All other systems reviewed and are negative.          Allergies     No Known Allergies    Physical Exam       Physical Exam  Constitutional:       General: He is not in acute distress.     Appearance: He is well-developed.   HENT:      Head: Normocephalic and atraumatic.   Cardiovascular:      Comments: Negative lower extremity edema  Pulmonary:      Effort: Pulmonary effort is normal.      Breath sounds: Normal breath sounds.   Abdominal:      Palpations: Abdomen is soft.   Musculoskeletal:         General: Normal range of motion.      Cervical back: Normal range of motion.   Skin:     General: Skin is warm.   Neurological:      Mental Status: He is alert and oriented to person, place, and time.   Psychiatric:         Behavior: Behavior normal.           Vital Signs  There were no vitals filed for this visit.      Current Medications       Current Outpatient Medications:     amLODIPine (NORVASC) 5 mg tablet, Take 5 mg by mouth daily, Disp: , Rfl:     atenolol (TENORMIN) 50 mg tablet, Take 50 mg by mouth daily, Disp: , Rfl:     ergocalciferol (VITAMIN D2) 50,000 units, Take 50,000 Units by mouth once a week, Disp: , Rfl:     metFORMIN (GLUCOPHAGE) 500 mg tablet, Take 500 mg by mouth 2 (two) times a day with meals With morning and evening, Disp: , Rfl:     tamsulosin (FLOMAX) 0.4 mg, TAKE 1 CAPSULE BY MOUTH AT BEDTIME, Disp: 90 capsule, Rfl: 1      Active Problems     Patient Active Problem List   Diagnosis    Elevated PSA    Benign prostatic hyperplasia with urinary frequency    HTN (hypertension)     "Diabetes mellitus, type 2 (HCC)    Anxiety    Prostate cancer (HCC)         Past Medical History     Past Medical History:   Diagnosis Date    Diabetes mellitus (HCC)     Elevated PSA     Full dentures     Hyperlipidemia     Hypertension     Language barrier     PANJABI    Wears glasses          Surgical History     Past Surgical History:   Procedure Laterality Date    NO PAST SURGERIES      [place] PROSTATE NEEDLE BIOPSY ANY APPROACH N/A 11/13/2024    Procedure: TRANSPERINEAL MRI FUSION BIOPSY PROSTATE;  Surgeon: Frankie Blount MD;  Location: [facility];  Service: Urology         Family History     Family History   Problem Relation Age of Onset    Diabetes Mother          Social History     Social History     Social History     Tobacco Use   Smoking Status Former    Types: Cigarettes   Smokeless Tobacco Never   Tobacco Comments    Quit before 1984         Pertinent Lab Values     Lab Results   Component Value Date    CREATININE 0.76 10/28/2024       Lab Results   Component Value Date    PSA 4.466 (H) 04/05/2025    PSA 4.282 (H) 08/05/2024    PSA 4.14 (H) 03/26/2024       @RESULTRCNT(1H])@      Pertinent Imaging       No results found.      Portions of the record may have been created with voice recognition software.  Occasional wrong word or \"sound a like\" substitutions may have occurred due to the inherent limitations of voice recognition software.  In addition some of the content generated from this outpatient encounter includes information designed for patient education and/or communication back to the referring provider.  Read the chart carefully and recognize, using context, where substitutions have occurred.    "

## 2025-06-08 DIAGNOSIS — R35.0 BENIGN PROSTATIC HYPERPLASIA WITH URINARY FREQUENCY: ICD-10-CM

## 2025-06-08 DIAGNOSIS — N40.1 BENIGN PROSTATIC HYPERPLASIA WITH URINARY FREQUENCY: ICD-10-CM

## 2025-06-09 RX ORDER — TAMSULOSIN HYDROCHLORIDE 0.4 MG/1
0.4 CAPSULE ORAL
Qty: 90 CAPSULE | Refills: 0 | Status: SHIPPED | OUTPATIENT
Start: 2025-06-09

## 2025-08-04 ENCOUNTER — HOSPITAL ENCOUNTER (OUTPATIENT)
Facility: MEDICAL CENTER | Age: 70
Discharge: HOME | End: 2025-08-04
Attending: UROLOGY
Payer: COMMERCIAL

## 2025-08-04 DIAGNOSIS — C61 PROSTATE CANCER (HCC): ICD-10-CM

## 2025-08-04 PROCEDURE — 76377 3D RENDER W/INTRP POSTPROCES: CPT

## 2025-08-04 PROCEDURE — A9585 GADOBUTROL INJECTION: HCPCS | Performed by: UROLOGY

## 2025-08-04 PROCEDURE — 72197 MRI PELVIS W/O & W/DYE: CPT

## 2025-08-04 RX ORDER — GADOBUTROL 604.72 MG/ML
8 INJECTION INTRAVENOUS
Status: COMPLETED | OUTPATIENT
Start: 2025-08-04 | End: 2025-08-04

## 2025-08-04 RX ADMIN — GADOBUTROL 8 ML: 604.72 INJECTION INTRAVENOUS at 10:54

## 2025-08-13 ENCOUNTER — APPOINTMENT (OUTPATIENT)
Dept: LAB | Facility: HOSPITAL | Age: 70
End: 2025-08-13
Payer: COMMERCIAL

## 2025-08-13 DIAGNOSIS — C61 PROSTATE CANCER (HCC): ICD-10-CM

## 2025-08-13 LAB
CREAT SERPL-MCNC: 0.78 MG/DL (ref 0.6–1.3)
GFR SERPL CREATININE-BSD FRML MDRD: 92 ML/MIN/1.73SQ M
PSA SERPL-MCNC: 4.53 NG/ML (ref 0–4)

## 2025-08-13 PROCEDURE — 84153 ASSAY OF PSA TOTAL: CPT

## 2025-08-13 PROCEDURE — 36415 COLL VENOUS BLD VENIPUNCTURE: CPT

## 2025-08-13 PROCEDURE — 82565 ASSAY OF CREATININE: CPT

## 2025-08-21 ENCOUNTER — OFFICE VISIT (OUTPATIENT)
Dept: UROLOGY | Facility: CLINIC | Age: 70
End: 2025-08-21
Payer: COMMERCIAL

## 2025-08-21 VITALS
HEART RATE: 53 BPM | OXYGEN SATURATION: 99 % | HEIGHT: 70 IN | SYSTOLIC BLOOD PRESSURE: 130 MMHG | WEIGHT: 184 LBS | BODY MASS INDEX: 26.34 KG/M2 | DIASTOLIC BLOOD PRESSURE: 72 MMHG

## 2025-08-21 DIAGNOSIS — C61 PROSTATE CANCER (HCC): Primary | ICD-10-CM

## 2025-08-21 PROCEDURE — 99213 OFFICE O/P EST LOW 20 MIN: CPT | Performed by: PHYSICIAN ASSISTANT

## 2025-08-21 RX ORDER — SODIUM CHLORIDE, SODIUM LACTATE, POTASSIUM CHLORIDE, CALCIUM CHLORIDE 600; 310; 30; 20 MG/100ML; MG/100ML; MG/100ML; MG/100ML
125 INJECTION, SOLUTION INTRAVENOUS CONTINUOUS
OUTPATIENT
Start: 2025-08-21

## (undated) DEVICE — SCD SEQUENTIAL COMPRESSION COMFORT SLEEVE MEDIUM KNEE LENGTH: Brand: KENDALL SCD

## (undated) DEVICE — MAX-CORE® DISPOSABLE CORE BIOPSY INSTRUMENT, 18G X 25CM: Brand: MAX-CORE

## (undated) DEVICE — INVIEW CLEAR LEGGINGS: Brand: CONVERTORS

## (undated) DEVICE — LUBRICANT JELLY SURGILUBE TUBE 2OZ FLIP TOP

## (undated) DEVICE — GLOVE SRG BIOGEL 8

## (undated) DEVICE — SPECIMEN CONTAINER STERILE PEEL PACK

## (undated) DEVICE — 3M™ TRANSPORE™ WHITE SURGICAL TAPE 1534-1, 1 INCH X 10 YARD (2,5CM X 9,1M), 12 ROLLS/CARTON 10 CARTONS/CASE: Brand: 3M™ TRANSPORE™

## (undated) DEVICE — CHLORAPREP HI-LITE 26ML ORANGE

## (undated) DEVICE — STERILE (2 X 30CM) LATEX COVER: Brand: PROCOVERS™

## (undated) DEVICE — PREP PAD BNS: Brand: CONVERTORS

## (undated) DEVICE — RAZOR STERILE

## (undated) DEVICE — UTILITY MARKER,BLACK WITH LABELS: Brand: DEVON

## (undated) DEVICE — SYRINGE,TOOMEY,IRRIGATION,70CC,STERILE: Brand: MEDLINE

## (undated) DEVICE — NEEDLE 25G X 1 1/2

## (undated) DEVICE — SYRINGE 10ML LL

## (undated) DEVICE — SPONGE 4 X 4 XRAY 16 PLY STRL LF RFD

## (undated) DEVICE — TELFA NON-ADHERENT ABSORBENT DRESSING: Brand: TELFA

## (undated) DEVICE — RENTAL PROBE ULTRASOUND DROP IN BK5000

## (undated) DEVICE — SYSTEM TRANSPERINEAL ACCESS PRECISIONPOINT

## (undated) DEVICE — 3M™ IOBAN™ 2 ANTIMICROBIAL INCISE DRAPE 6650EZ: Brand: IOBAN™ 2

## (undated) DEVICE — NEEDLE SPINAL 22G X 3.5IN  QUINCKE

## (undated) DEVICE — HOLDER PROBE URONAV BK8848

## (undated) DEVICE — FORMALIN PRE-FILLED 10 PCT PROSTATE BIOPSY

## (undated) DEVICE — ULTRASOUND GEL STERILE FOIL PK